# Patient Record
Sex: FEMALE | Race: WHITE | NOT HISPANIC OR LATINO | Employment: OTHER | URBAN - NONMETROPOLITAN AREA
[De-identification: names, ages, dates, MRNs, and addresses within clinical notes are randomized per-mention and may not be internally consistent; named-entity substitution may affect disease eponyms.]

---

## 2018-01-01 ENCOUNTER — LAB REQUISITION (OUTPATIENT)
Dept: LAB | Facility: HOSPITAL | Age: 83
End: 2018-01-01

## 2018-01-01 ENCOUNTER — APPOINTMENT (OUTPATIENT)
Dept: GENERAL RADIOLOGY | Facility: HOSPITAL | Age: 83
End: 2018-01-01

## 2018-01-01 ENCOUNTER — HOSPITAL ENCOUNTER (INPATIENT)
Facility: HOSPITAL | Age: 83
LOS: 8 days | End: 2018-12-03
Attending: EMERGENCY MEDICINE | Admitting: INTERNAL MEDICINE

## 2018-01-01 ENCOUNTER — APPOINTMENT (OUTPATIENT)
Dept: CT IMAGING | Facility: HOSPITAL | Age: 83
End: 2018-01-01

## 2018-01-01 ENCOUNTER — OFFICE VISIT (OUTPATIENT)
Dept: UROLOGY | Facility: CLINIC | Age: 83
End: 2018-01-01

## 2018-01-01 ENCOUNTER — ANESTHESIA (OUTPATIENT)
Dept: PERIOP | Facility: HOSPITAL | Age: 83
End: 2018-01-01

## 2018-01-01 ENCOUNTER — ANESTHESIA EVENT (OUTPATIENT)
Dept: PERIOP | Facility: HOSPITAL | Age: 83
End: 2018-01-01

## 2018-01-01 ENCOUNTER — HOSPITAL ENCOUNTER (INPATIENT)
Facility: HOSPITAL | Age: 83
LOS: 7 days | Discharge: NURSING, REHAB OR LONG-TERM CARE | End: 2018-09-07
Attending: FAMILY MEDICINE | Admitting: ORTHOPAEDIC SURGERY

## 2018-01-01 VITALS — TEMPERATURE: 97.2 F | WEIGHT: 108 LBS | HEIGHT: 63 IN | BODY MASS INDEX: 19.14 KG/M2

## 2018-01-01 VITALS
RESPIRATION RATE: 22 BRPM | TEMPERATURE: 97.9 F | BODY MASS INDEX: 23.77 KG/M2 | HEIGHT: 61 IN | SYSTOLIC BLOOD PRESSURE: 54 MMHG | OXYGEN SATURATION: 73 % | DIASTOLIC BLOOD PRESSURE: 32 MMHG | WEIGHT: 125.9 LBS

## 2018-01-01 VITALS
SYSTOLIC BLOOD PRESSURE: 113 MMHG | WEIGHT: 114.44 LBS | BODY MASS INDEX: 20.28 KG/M2 | RESPIRATION RATE: 18 BRPM | OXYGEN SATURATION: 97 % | TEMPERATURE: 98.5 F | DIASTOLIC BLOOD PRESSURE: 42 MMHG | HEIGHT: 63 IN | HEART RATE: 93 BPM

## 2018-01-01 DIAGNOSIS — Z74.09 IMPAIRED MOBILITY: ICD-10-CM

## 2018-01-01 DIAGNOSIS — R41.82 ALTERED MENTAL STATUS, UNSPECIFIED ALTERED MENTAL STATUS TYPE: ICD-10-CM

## 2018-01-01 DIAGNOSIS — Z00.00 ENCOUNTER FOR GENERAL ADULT MEDICAL EXAMINATION WITHOUT ABNORMAL FINDINGS: ICD-10-CM

## 2018-01-01 DIAGNOSIS — R53.1 DECREASED STRENGTH, ENDURANCE, AND MOBILITY: ICD-10-CM

## 2018-01-01 DIAGNOSIS — Z78.9 IMPAIRED MOBILITY AND ADLS: ICD-10-CM

## 2018-01-01 DIAGNOSIS — R68.89 DECREASED STRENGTH, ENDURANCE, AND MOBILITY: ICD-10-CM

## 2018-01-01 DIAGNOSIS — Z74.09 DECREASED STRENGTH, ENDURANCE, AND MOBILITY: ICD-10-CM

## 2018-01-01 DIAGNOSIS — R33.9 URINARY RETENTION: Primary | ICD-10-CM

## 2018-01-01 DIAGNOSIS — A04.72 C. DIFFICILE DIARRHEA: ICD-10-CM

## 2018-01-01 DIAGNOSIS — Z78.9 DECREASED ACTIVITIES OF DAILY LIVING (ADL): ICD-10-CM

## 2018-01-01 DIAGNOSIS — K56.609 BOWEL OBSTRUCTION (HCC): ICD-10-CM

## 2018-01-01 DIAGNOSIS — Z74.09 IMPAIRED MOBILITY AND ADLS: ICD-10-CM

## 2018-01-01 DIAGNOSIS — R13.12 OROPHARYNGEAL DYSPHAGIA: ICD-10-CM

## 2018-01-01 DIAGNOSIS — N39.0 ACUTE UTI: Primary | ICD-10-CM

## 2018-01-01 LAB
25(OH)D3 SERPL-MCNC: 36.6 NG/ML (ref 30–100)
A-A DO2: ABNORMAL MMHG
ABO GROUP BLD: NORMAL
ADV 40+41 DNA STL QL NAA+NON-PROBE: NOT DETECTED
ALBUMIN SERPL-MCNC: 1.8 G/DL (ref 3.5–5)
ALBUMIN SERPL-MCNC: 2.1 G/DL (ref 3.5–5)
ALBUMIN SERPL-MCNC: 2.2 G/DL (ref 3.5–5)
ALBUMIN SERPL-MCNC: 2.4 G/DL (ref 3.5–5)
ALBUMIN SERPL-MCNC: 2.5 G/DL (ref 3.5–5)
ALBUMIN SERPL-MCNC: 3 G/DL (ref 3.5–5)
ALBUMIN SERPL-MCNC: 3 G/DL (ref 3.5–5)
ALBUMIN SERPL-MCNC: 3.7 G/DL (ref 3.5–5)
ALBUMIN/GLOB SERPL: 0.8 G/DL (ref 1.1–2.5)
ALBUMIN/GLOB SERPL: 0.9 G/DL (ref 1.1–2.5)
ALBUMIN/GLOB SERPL: 0.9 G/DL (ref 1.1–2.5)
ALBUMIN/GLOB SERPL: 1 G/DL (ref 1.1–2.5)
ALBUMIN/GLOB SERPL: 1 G/DL (ref 1.1–2.5)
ALBUMIN/GLOB SERPL: 1.1 G/DL (ref 1.1–2.5)
ALBUMIN/GLOB SERPL: 1.3 G/DL (ref 1.1–2.5)
ALBUMIN/GLOB SERPL: 1.3 G/DL (ref 1.1–2.5)
ALP SERPL-CCNC: 108 U/L (ref 24–120)
ALP SERPL-CCNC: 115 U/L (ref 24–120)
ALP SERPL-CCNC: 122 U/L (ref 24–120)
ALP SERPL-CCNC: 133 U/L (ref 24–120)
ALP SERPL-CCNC: 157 U/L (ref 24–120)
ALP SERPL-CCNC: 167 U/L (ref 24–120)
ALP SERPL-CCNC: 257 U/L (ref 24–120)
ALP SERPL-CCNC: 261 U/L (ref 24–120)
ALT SERPL W P-5'-P-CCNC: 23 U/L (ref 0–54)
ALT SERPL W P-5'-P-CCNC: 25 U/L (ref 0–54)
ALT SERPL W P-5'-P-CCNC: 26 U/L (ref 0–54)
ALT SERPL W P-5'-P-CCNC: 26 U/L (ref 0–54)
ALT SERPL W P-5'-P-CCNC: 34 U/L (ref 0–54)
ALT SERPL W P-5'-P-CCNC: 36 U/L (ref 0–54)
ALT SERPL W P-5'-P-CCNC: 36 U/L (ref 0–54)
ALT SERPL W P-5'-P-CCNC: 44 U/L (ref 0–54)
AMYLASE SERPL-CCNC: 1782 U/L (ref 30–110)
ANION GAP SERPL CALCULATED.3IONS-SCNC: 13 MMOL/L (ref 4–13)
ANION GAP SERPL CALCULATED.3IONS-SCNC: 16 MMOL/L (ref 4–13)
ANION GAP SERPL CALCULATED.3IONS-SCNC: 17 MMOL/L (ref 4–13)
ANION GAP SERPL CALCULATED.3IONS-SCNC: 4 MMOL/L (ref 4–13)
ANION GAP SERPL CALCULATED.3IONS-SCNC: 4 MMOL/L (ref 4–13)
ANION GAP SERPL CALCULATED.3IONS-SCNC: 5 MMOL/L (ref 4–13)
ANION GAP SERPL CALCULATED.3IONS-SCNC: 7 MMOL/L (ref 4–13)
ANION GAP SERPL CALCULATED.3IONS-SCNC: 8 MMOL/L (ref 4–13)
ANION GAP SERPL CALCULATED.3IONS-SCNC: 9 MMOL/L (ref 4–13)
ANISOCYTOSIS BLD QL: ABNORMAL
ANISOCYTOSIS BLD QL: ABNORMAL
ARTERIAL PATENCY WRIST A: POSITIVE
ARTICHOKE IGE QN: 89 MG/DL (ref 0–99)
AST SERPL-CCNC: 119 U/L (ref 7–45)
AST SERPL-CCNC: 28 U/L (ref 7–45)
AST SERPL-CCNC: 41 U/L (ref 7–45)
AST SERPL-CCNC: 43 U/L (ref 7–45)
AST SERPL-CCNC: 46 U/L (ref 7–45)
AST SERPL-CCNC: 60 U/L (ref 7–45)
AST SERPL-CCNC: 63 U/L (ref 7–45)
AST SERPL-CCNC: 93 U/L (ref 7–45)
ASTRO TYP 1-8 RNA STL QL NAA+NON-PROBE: NOT DETECTED
ATMOSPHERIC PRESS: 736 MMHG
ATMOSPHERIC PRESS: 738 MMHG
ATMOSPHERIC PRESS: 744 MMHG
ATMOSPHERIC PRESS: 746 MMHG
BACTERIA SPEC AEROBE CULT: ABNORMAL
BACTERIA SPEC AEROBE CULT: NORMAL
BACTERIA UR QL AUTO: ABNORMAL /HPF
BASE EXCESS BLDA CALC-SCNC: -10.1 MMOL/L (ref 0–2)
BASE EXCESS BLDA CALC-SCNC: -12.2 MMOL/L (ref 0–2)
BASE EXCESS BLDA CALC-SCNC: -7.8 MMOL/L (ref 0–2)
BASE EXCESS BLDA CALC-SCNC: -7.9 MMOL/L (ref 0–2)
BASOPHILS # BLD AUTO: 0.06 10*3/MM3 (ref 0–0.2)
BASOPHILS # BLD AUTO: 0.07 10*3/MM3 (ref 0–0.2)
BASOPHILS # BLD AUTO: 0.08 10*3/MM3 (ref 0–0.2)
BASOPHILS # BLD AUTO: 0.08 10*3/MM3 (ref 0–0.2)
BASOPHILS # BLD AUTO: 0.09 10*3/MM3 (ref 0–0.2)
BASOPHILS # BLD AUTO: 0.1 10*3/MM3 (ref 0–0.2)
BASOPHILS # BLD AUTO: 0.12 10*3/MM3 (ref 0–0.2)
BASOPHILS # BLD MANUAL: 0.25 10*3/MM3 (ref 0–0.2)
BASOPHILS NFR BLD AUTO: 0.4 % (ref 0–2)
BASOPHILS NFR BLD AUTO: 0.6 % (ref 0–2)
BASOPHILS NFR BLD AUTO: 0.8 % (ref 0–2)
BASOPHILS NFR BLD AUTO: 2 % (ref 0–2)
BDY SITE: ABNORMAL
BILIRUB SERPL-MCNC: 0.5 MG/DL (ref 0.1–1)
BILIRUB SERPL-MCNC: 0.6 MG/DL (ref 0.1–1)
BILIRUB SERPL-MCNC: 0.7 MG/DL (ref 0.1–1)
BILIRUB SERPL-MCNC: 0.8 MG/DL (ref 0.1–1)
BILIRUB SERPL-MCNC: 0.9 MG/DL (ref 0.1–1)
BILIRUB SERPL-MCNC: 1 MG/DL (ref 0.1–1)
BILIRUB UR QL STRIP: NEGATIVE
BLD GP AB SCN SERPL QL: NEGATIVE
BODY TEMPERATURE: 37 C
BUN BLD-MCNC: 13 MG/DL (ref 5–21)
BUN BLD-MCNC: 14 MG/DL (ref 5–21)
BUN BLD-MCNC: 14 MG/DL (ref 5–21)
BUN BLD-MCNC: 15 MG/DL (ref 5–21)
BUN BLD-MCNC: 15 MG/DL (ref 5–21)
BUN BLD-MCNC: 16 MG/DL (ref 5–21)
BUN BLD-MCNC: 21 MG/DL (ref 5–21)
BUN BLD-MCNC: 21 MG/DL (ref 5–21)
BUN BLD-MCNC: 22 MG/DL (ref 5–21)
BUN BLD-MCNC: 24 MG/DL (ref 5–21)
BUN BLD-MCNC: 29 MG/DL (ref 5–21)
BUN BLD-MCNC: 29 MG/DL (ref 5–21)
BUN BLD-MCNC: 38 MG/DL (ref 5–21)
BUN BLD-MCNC: 38 MG/DL (ref 5–21)
BUN BLD-MCNC: 46 MG/DL (ref 5–21)
BUN BLD-MCNC: 47 MG/DL (ref 5–21)
BUN BLD-MCNC: 54 MG/DL (ref 5–21)
BUN/CREAT SERPL: 12 (ref 7–25)
BUN/CREAT SERPL: 13.6 (ref 7–25)
BUN/CREAT SERPL: 15.9 (ref 7–25)
BUN/CREAT SERPL: 16.3 (ref 7–25)
BUN/CREAT SERPL: 17.8 (ref 7–25)
BUN/CREAT SERPL: 18.6 (ref 7–25)
BUN/CREAT SERPL: 18.8 (ref 7–25)
BUN/CREAT SERPL: 19.7 (ref 7–25)
BUN/CREAT SERPL: 20 (ref 7–25)
BUN/CREAT SERPL: 20.3 (ref 7–25)
BUN/CREAT SERPL: 23.3 (ref 7–25)
BUN/CREAT SERPL: 24.7 (ref 7–25)
BUN/CREAT SERPL: 25.5 (ref 7–25)
BUN/CREAT SERPL: 31.5 (ref 7–25)
BUN/CREAT SERPL: 37.6 (ref 7–25)
BURR CELLS BLD QL SMEAR: ABNORMAL
BURR CELLS BLD QL SMEAR: ABNORMAL
C CAYETANENSIS DNA STL QL NAA+NON-PROBE: NOT DETECTED
C DIFF TOX GENS STL QL NAA+PROBE: DETECTED
CA-I BLD-MCNC: 4.1 MG/DL (ref 4.6–5.4)
CA-I BLD-MCNC: 4.2 MG/DL (ref 4.6–5.4)
CA-I BLD-MCNC: 4.32 MG/DL (ref 4.6–5.4)
CALCIUM SPEC-SCNC: 6.8 MG/DL (ref 8.4–10.4)
CALCIUM SPEC-SCNC: 7.1 MG/DL (ref 8.4–10.4)
CALCIUM SPEC-SCNC: 7.3 MG/DL (ref 8.4–10.4)
CALCIUM SPEC-SCNC: 8.1 MG/DL (ref 8.4–10.4)
CALCIUM SPEC-SCNC: 8.3 MG/DL (ref 8.4–10.4)
CALCIUM SPEC-SCNC: 8.4 MG/DL (ref 8.4–10.4)
CALCIUM SPEC-SCNC: 8.4 MG/DL (ref 8.4–10.4)
CALCIUM SPEC-SCNC: 8.5 MG/DL (ref 8.4–10.4)
CALCIUM SPEC-SCNC: 8.6 MG/DL (ref 8.4–10.4)
CALCIUM SPEC-SCNC: 9 MG/DL (ref 8.4–10.4)
CALCIUM SPEC-SCNC: 9.1 MG/DL (ref 8.4–10.4)
CALCIUM SPEC-SCNC: 9.2 MG/DL (ref 8.4–10.4)
CAMPY SP DNA.DIARRHEA STL QL NAA+PROBE: NOT DETECTED
CHLORIDE SERPL-SCNC: 103 MMOL/L (ref 98–110)
CHLORIDE SERPL-SCNC: 104 MMOL/L (ref 98–110)
CHLORIDE SERPL-SCNC: 105 MMOL/L (ref 98–110)
CHLORIDE SERPL-SCNC: 106 MMOL/L (ref 98–110)
CHLORIDE SERPL-SCNC: 107 MMOL/L (ref 98–110)
CHLORIDE SERPL-SCNC: 109 MMOL/L (ref 98–110)
CHLORIDE SERPL-SCNC: 110 MMOL/L (ref 98–110)
CHLORIDE SERPL-SCNC: 110 MMOL/L (ref 98–110)
CHLORIDE SERPL-SCNC: 96 MMOL/L (ref 98–110)
CHOLEST SERPL-MCNC: 108 MG/DL (ref 130–200)
CHOLEST SERPL-MCNC: <50 MG/DL (ref 130–200)
CLARITY UR: ABNORMAL
CLARITY UR: CLEAR
CLARITY UR: CLEAR
CO2 SERPL-SCNC: 12 MMOL/L (ref 24–31)
CO2 SERPL-SCNC: 15 MMOL/L (ref 24–31)
CO2 SERPL-SCNC: 16 MMOL/L (ref 24–31)
CO2 SERPL-SCNC: 17 MMOL/L (ref 24–31)
CO2 SERPL-SCNC: 20 MMOL/L (ref 24–31)
CO2 SERPL-SCNC: 22 MMOL/L (ref 24–31)
CO2 SERPL-SCNC: 24 MMOL/L (ref 24–31)
CO2 SERPL-SCNC: 25 MMOL/L (ref 24–31)
CO2 SERPL-SCNC: 25 MMOL/L (ref 24–31)
CO2 SERPL-SCNC: 26 MMOL/L (ref 24–31)
CO2 SERPL-SCNC: 27 MMOL/L (ref 24–31)
CO2 SERPL-SCNC: 27 MMOL/L (ref 24–31)
CO2 SERPL-SCNC: 28 MMOL/L (ref 24–31)
CO2 SERPL-SCNC: 28 MMOL/L (ref 24–31)
CO2 SERPL-SCNC: 31 MMOL/L (ref 24–31)
COHGB MFR BLD: 1 % (ref 0–5)
COLOR UR: ABNORMAL
COLOR UR: YELLOW
CREAT BLD-MCNC: 0.74 MG/DL (ref 0.5–1.4)
CREAT BLD-MCNC: 0.85 MG/DL (ref 0.5–1.4)
CREAT BLD-MCNC: 0.88 MG/DL (ref 0.5–1.4)
CREAT BLD-MCNC: 0.9 MG/DL (ref 0.5–1.4)
CREAT BLD-MCNC: 0.9 MG/DL (ref 0.5–1.4)
CREAT BLD-MCNC: 0.92 MG/DL (ref 0.5–1.4)
CREAT BLD-MCNC: 0.92 MG/DL (ref 0.5–1.4)
CREAT BLD-MCNC: 0.94 MG/DL (ref 0.5–1.4)
CREAT BLD-MCNC: 1.01 MG/DL (ref 0.5–1.4)
CREAT BLD-MCNC: 1.03 MG/DL (ref 0.5–1.4)
CREAT BLD-MCNC: 1.08 MG/DL (ref 0.5–1.4)
CREAT BLD-MCNC: 1.18 MG/DL (ref 0.5–1.4)
CREAT BLD-MCNC: 1.47 MG/DL (ref 0.5–1.4)
CREAT BLD-MCNC: 2.04 MG/DL (ref 0.5–1.4)
CREAT BLD-MCNC: 2.47 MG/DL (ref 0.5–1.4)
CREAT BLD-MCNC: 2.5 MG/DL (ref 0.5–1.4)
CREAT BLD-MCNC: 2.7 MG/DL (ref 0.5–1.4)
CREAT UR-MCNC: 58 MG/DL
CRP SERPL-MCNC: 15.78 MG/DL (ref 0–0.99)
CRYPTOSP STL CULT: NOT DETECTED
CYTOLOGIST CVX/VAG CYTO: NORMAL
D-LACTATE SERPL-SCNC: 1.3 MMOL/L (ref 0.5–2)
D-LACTATE SERPL-SCNC: 1.8 MMOL/L (ref 0.5–2)
D-LACTATE SERPL-SCNC: 2 MMOL/L (ref 0.5–2)
DEPRECATED RDW RBC AUTO: 44.2 FL (ref 40–54)
DEPRECATED RDW RBC AUTO: 44.4 FL (ref 40–54)
DEPRECATED RDW RBC AUTO: 45.1 FL (ref 40–54)
DEPRECATED RDW RBC AUTO: 45.5 FL (ref 40–54)
DEPRECATED RDW RBC AUTO: 45.5 FL (ref 40–54)
DEPRECATED RDW RBC AUTO: 46.5 FL (ref 40–54)
DEPRECATED RDW RBC AUTO: 47.2 FL (ref 40–54)
DEPRECATED RDW RBC AUTO: 48.4 FL (ref 40–54)
DEPRECATED RDW RBC AUTO: 49.1 FL (ref 40–54)
DEPRECATED RDW RBC AUTO: 49.5 FL (ref 40–54)
DEPRECATED RDW RBC AUTO: 49.6 FL (ref 40–54)
DEPRECATED RDW RBC AUTO: 49.7 FL (ref 40–54)
DEPRECATED RDW RBC AUTO: 50.4 FL (ref 40–54)
E COLI DNA SPEC QL NAA+PROBE: NOT DETECTED
E HISTOLYT AG STL-ACNC: NOT DETECTED
EAEC PAA PLAS AGGR+AATA ST NAA+NON-PRB: NOT DETECTED
EC STX1 + STX2 GENES STL NAA+PROBE: NOT DETECTED
EOSINOPHIL # BLD AUTO: 0.23 10*3/MM3 (ref 0–0.7)
EOSINOPHIL # BLD AUTO: 0.25 10*3/MM3 (ref 0–0.7)
EOSINOPHIL # BLD AUTO: 0.32 10*3/MM3 (ref 0–0.7)
EOSINOPHIL # BLD AUTO: 0.4 10*3/MM3 (ref 0–0.7)
EOSINOPHIL # BLD AUTO: 0.45 10*3/MM3 (ref 0–0.7)
EOSINOPHIL # BLD AUTO: 0.49 10*3/MM3 (ref 0–0.7)
EOSINOPHIL # BLD AUTO: 0.49 10*3/MM3 (ref 0–0.7)
EOSINOPHIL # BLD MANUAL: 0.25 10*3/MM3 (ref 0–0.7)
EOSINOPHIL NFR BLD AUTO: 0.9 % (ref 0–4)
EOSINOPHIL NFR BLD AUTO: 1.6 % (ref 0–4)
EOSINOPHIL NFR BLD AUTO: 1.7 % (ref 0–4)
EOSINOPHIL NFR BLD AUTO: 2.6 % (ref 0–4)
EOSINOPHIL NFR BLD AUTO: 3.2 % (ref 0–4)
EOSINOPHIL NFR BLD AUTO: 3.6 % (ref 0–4)
EOSINOPHIL NFR BLD AUTO: 4.6 % (ref 0–4)
EOSINOPHIL NFR BLD MANUAL: 2 % (ref 0–4)
EPEC EAE GENE STL QL NAA+NON-PROBE: NOT DETECTED
ERYTHROCYTE [DISTWIDTH] IN BLOOD BY AUTOMATED COUNT: 13.2 % (ref 12–15)
ERYTHROCYTE [DISTWIDTH] IN BLOOD BY AUTOMATED COUNT: 13.3 % (ref 12–15)
ERYTHROCYTE [DISTWIDTH] IN BLOOD BY AUTOMATED COUNT: 13.3 % (ref 12–15)
ERYTHROCYTE [DISTWIDTH] IN BLOOD BY AUTOMATED COUNT: 13.4 % (ref 12–15)
ERYTHROCYTE [DISTWIDTH] IN BLOOD BY AUTOMATED COUNT: 13.5 % (ref 12–15)
ERYTHROCYTE [DISTWIDTH] IN BLOOD BY AUTOMATED COUNT: 14.8 % (ref 12–15)
ERYTHROCYTE [DISTWIDTH] IN BLOOD BY AUTOMATED COUNT: 14.9 % (ref 12–15)
ERYTHROCYTE [DISTWIDTH] IN BLOOD BY AUTOMATED COUNT: 14.9 % (ref 12–15)
ERYTHROCYTE [DISTWIDTH] IN BLOOD BY AUTOMATED COUNT: 15 % (ref 12–15)
ERYTHROCYTE [DISTWIDTH] IN BLOOD BY AUTOMATED COUNT: 15.3 % (ref 12–15)
ERYTHROCYTE [DISTWIDTH] IN BLOOD BY AUTOMATED COUNT: 15.4 % (ref 12–15)
ERYTHROCYTE [DISTWIDTH] IN BLOOD BY AUTOMATED COUNT: 15.6 % (ref 12–15)
ERYTHROCYTE [DISTWIDTH] IN BLOOD BY AUTOMATED COUNT: 15.8 % (ref 12–15)
ERYTHROCYTE [SEDIMENTATION RATE] IN BLOOD: <1 MM/HR (ref 0–20)
ETEC LTA+ST1A+ST1B TOX ST NAA+NON-PROBE: NOT DETECTED
G LAMBLIA DNA SPEC QL NAA+PROBE: NOT DETECTED
GAS FLOW AIRWAY: 2 LPM
GFR SERPL CREATININE-BSD FRML MDRD: 17 ML/MIN/1.73
GFR SERPL CREATININE-BSD FRML MDRD: 18 ML/MIN/1.73
GFR SERPL CREATININE-BSD FRML MDRD: 18 ML/MIN/1.73
GFR SERPL CREATININE-BSD FRML MDRD: 23 ML/MIN/1.73
GFR SERPL CREATININE-BSD FRML MDRD: 34 ML/MIN/1.73
GFR SERPL CREATININE-BSD FRML MDRD: 43 ML/MIN/1.73
GFR SERPL CREATININE-BSD FRML MDRD: 48 ML/MIN/1.73
GFR SERPL CREATININE-BSD FRML MDRD: 51 ML/MIN/1.73
GFR SERPL CREATININE-BSD FRML MDRD: 52 ML/MIN/1.73
GFR SERPL CREATININE-BSD FRML MDRD: 56 ML/MIN/1.73
GFR SERPL CREATININE-BSD FRML MDRD: 58 ML/MIN/1.73
GFR SERPL CREATININE-BSD FRML MDRD: 58 ML/MIN/1.73
GFR SERPL CREATININE-BSD FRML MDRD: 59 ML/MIN/1.73
GFR SERPL CREATININE-BSD FRML MDRD: 59 ML/MIN/1.73
GFR SERPL CREATININE-BSD FRML MDRD: 61 ML/MIN/1.73
GFR SERPL CREATININE-BSD FRML MDRD: 63 ML/MIN/1.73
GFR SERPL CREATININE-BSD FRML MDRD: 74 ML/MIN/1.73
GLOBULIN UR ELPH-MCNC: 1.8 GM/DL
GLOBULIN UR ELPH-MCNC: 1.9 GM/DL
GLOBULIN UR ELPH-MCNC: 2.1 GM/DL
GLOBULIN UR ELPH-MCNC: 2.4 GM/DL
GLOBULIN UR ELPH-MCNC: 2.8 GM/DL
GLOBULIN UR ELPH-MCNC: 2.8 GM/DL
GLOBULIN UR ELPH-MCNC: 3.1 GM/DL
GLOBULIN UR ELPH-MCNC: 3.2 GM/DL
GLUCOSE BLD-MCNC: 100 MG/DL (ref 70–100)
GLUCOSE BLD-MCNC: 108 MG/DL (ref 70–100)
GLUCOSE BLD-MCNC: 109 MG/DL (ref 70–100)
GLUCOSE BLD-MCNC: 112 MG/DL (ref 70–100)
GLUCOSE BLD-MCNC: 128 MG/DL (ref 70–100)
GLUCOSE BLD-MCNC: 185 MG/DL (ref 70–100)
GLUCOSE BLD-MCNC: 57 MG/DL (ref 70–100)
GLUCOSE BLD-MCNC: 65 MG/DL (ref 70–100)
GLUCOSE BLD-MCNC: 65 MG/DL (ref 70–100)
GLUCOSE BLD-MCNC: 70 MG/DL (ref 70–100)
GLUCOSE BLD-MCNC: 78 MG/DL (ref 70–100)
GLUCOSE BLD-MCNC: 83 MG/DL (ref 70–100)
GLUCOSE BLD-MCNC: 84 MG/DL (ref 70–100)
GLUCOSE BLD-MCNC: 86 MG/DL (ref 70–100)
GLUCOSE BLD-MCNC: 87 MG/DL (ref 70–100)
GLUCOSE BLD-MCNC: 89 MG/DL (ref 70–100)
GLUCOSE BLD-MCNC: 94 MG/DL (ref 70–100)
GLUCOSE BLDC GLUCOMTR-MCNC: 137 MG/DL (ref 70–130)
GLUCOSE BLDC GLUCOMTR-MCNC: 192 MG/DL (ref 70–130)
GLUCOSE BLDC GLUCOMTR-MCNC: 80 MG/DL (ref 70–130)
GLUCOSE UR STRIP-MCNC: NEGATIVE MG/DL
HCO3 BLDA-SCNC: 13.7 MMOL/L (ref 20–26)
HCO3 BLDA-SCNC: 15.6 MMOL/L (ref 20–26)
HCO3 BLDA-SCNC: 15.6 MMOL/L (ref 20–26)
HCO3 BLDA-SCNC: 16.8 MMOL/L (ref 20–26)
HCT VFR BLD AUTO: 26.8 % (ref 37–47)
HCT VFR BLD AUTO: 27.5 % (ref 37–47)
HCT VFR BLD AUTO: 27.9 % (ref 37–47)
HCT VFR BLD AUTO: 28.5 % (ref 37–47)
HCT VFR BLD AUTO: 29.6 % (ref 37–47)
HCT VFR BLD AUTO: 29.6 % (ref 37–47)
HCT VFR BLD AUTO: 32 % (ref 37–47)
HCT VFR BLD AUTO: 32.5 % (ref 37–47)
HCT VFR BLD AUTO: 32.6 % (ref 37–47)
HCT VFR BLD AUTO: 32.7 % (ref 37–47)
HCT VFR BLD AUTO: 33.2 % (ref 37–47)
HCT VFR BLD AUTO: 33.7 % (ref 37–47)
HCT VFR BLD AUTO: 37.1 % (ref 37–47)
HCT VFR BLD AUTO: 38.4 % (ref 37–47)
HCT VFR BLD AUTO: 38.8 % (ref 37–47)
HCT VFR BLD AUTO: 39 % (ref 37–47)
HCT VFR BLD AUTO: 39.5 % (ref 37–47)
HCT VFR BLD AUTO: 41.4 % (ref 37–47)
HCT VFR BLD AUTO: 42.1 % (ref 37–47)
HCT VFR BLD CALC: 27.5 % (ref 38–51)
HDLC SERPL-MCNC: 11 MG/DL
HGB BLD-MCNC: 10.5 G/DL (ref 12–16)
HGB BLD-MCNC: 10.6 G/DL (ref 12–16)
HGB BLD-MCNC: 10.7 G/DL (ref 12–16)
HGB BLD-MCNC: 10.8 G/DL (ref 12–16)
HGB BLD-MCNC: 12.1 G/DL (ref 12–16)
HGB BLD-MCNC: 12.2 G/DL (ref 12–16)
HGB BLD-MCNC: 12.6 G/DL (ref 12–16)
HGB BLD-MCNC: 12.8 G/DL (ref 12–16)
HGB BLD-MCNC: 12.8 G/DL (ref 12–16)
HGB BLD-MCNC: 13.4 G/DL (ref 12–16)
HGB BLD-MCNC: 13.6 G/DL (ref 12–16)
HGB BLD-MCNC: 8.7 G/DL (ref 12–16)
HGB BLD-MCNC: 8.9 G/DL (ref 12–16)
HGB BLD-MCNC: 9 G/DL (ref 12–16)
HGB BLD-MCNC: 9.4 G/DL (ref 12–16)
HGB BLD-MCNC: 9.7 G/DL (ref 12–16)
HGB BLD-MCNC: 9.8 G/DL (ref 12–16)
HGB BLDA-MCNC: 9 G/DL (ref 13.5–17.5)
HGB UR QL STRIP.AUTO: ABNORMAL
HGB UR QL STRIP.AUTO: NEGATIVE
HGB UR QL STRIP.AUTO: NEGATIVE
HOROWITZ INDEX BLD+IHG-RTO: 30 %
HOROWITZ INDEX BLD+IHG-RTO: 30 %
HOROWITZ INDEX BLD+IHG-RTO: 50 %
HYALINE CASTS UR QL AUTO: ABNORMAL /LPF
HYPOCHROMIA BLD QL: ABNORMAL
IMM GRANULOCYTES # BLD: 0.05 10*3/MM3 (ref 0–0.03)
IMM GRANULOCYTES # BLD: 0.08 10*3/MM3 (ref 0–0.03)
IMM GRANULOCYTES # BLD: 0.15 10*3/MM3 (ref 0–0.03)
IMM GRANULOCYTES # BLD: 0.15 10*3/MM3 (ref 0–0.03)
IMM GRANULOCYTES # BLD: 0.18 10*3/MM3 (ref 0–0.03)
IMM GRANULOCYTES # BLD: 0.26 10*3/MM3 (ref 0–0.03)
IMM GRANULOCYTES # BLD: 0.34 10*3/MM3 (ref 0–0.03)
IMM GRANULOCYTES NFR BLD: 0.5 % (ref 0–5)
IMM GRANULOCYTES NFR BLD: 0.7 % (ref 0–5)
IMM GRANULOCYTES NFR BLD: 0.8 % (ref 0–5)
IMM GRANULOCYTES NFR BLD: 1 % (ref 0–5)
IMM GRANULOCYTES NFR BLD: 2.2 % (ref 0–5)
INR PPP: 1.61 (ref 0.91–1.09)
IRON 24H UR-MRATE: 32 MCG/DL (ref 42–180)
IRON SATN MFR SERPL: 13 % (ref 20–45)
KETONES UR QL STRIP: ABNORMAL
KETONES UR QL STRIP: ABNORMAL
KETONES UR QL STRIP: NEGATIVE
LDLC/HDLC SERPL: 7.24 {RATIO}
LEUKOCYTE ESTERASE UR QL STRIP.AUTO: ABNORMAL
LIPASE SERPL-CCNC: 2080 U/L (ref 23–203)
LIPASE SERPL-CCNC: 2981 U/L (ref 23–203)
LYMPHOCYTES # BLD AUTO: 1.12 10*3/MM3 (ref 0.72–4.86)
LYMPHOCYTES # BLD AUTO: 1.23 10*3/MM3 (ref 0.72–4.86)
LYMPHOCYTES # BLD AUTO: 1.28 10*3/MM3 (ref 0.72–4.86)
LYMPHOCYTES # BLD AUTO: 1.31 10*3/MM3 (ref 0.72–4.86)
LYMPHOCYTES # BLD AUTO: 1.38 10*3/MM3 (ref 0.72–4.86)
LYMPHOCYTES # BLD AUTO: 1.49 10*3/MM3 (ref 0.72–4.86)
LYMPHOCYTES # BLD AUTO: 1.63 10*3/MM3 (ref 0.72–4.86)
LYMPHOCYTES # BLD MANUAL: 0 10*3/MM3 (ref 0.72–4.86)
LYMPHOCYTES # BLD MANUAL: 0 10*3/MM3 (ref 0.72–4.86)
LYMPHOCYTES # BLD MANUAL: 1.01 10*3/MM3 (ref 0.72–4.86)
LYMPHOCYTES NFR BLD AUTO: 14.7 % (ref 15–45)
LYMPHOCYTES NFR BLD AUTO: 15.1 % (ref 15–45)
LYMPHOCYTES NFR BLD AUTO: 4.4 % (ref 15–45)
LYMPHOCYTES NFR BLD AUTO: 6.5 % (ref 15–45)
LYMPHOCYTES NFR BLD AUTO: 7.1 % (ref 15–45)
LYMPHOCYTES NFR BLD AUTO: 8.4 % (ref 15–45)
LYMPHOCYTES NFR BLD AUTO: 9.1 % (ref 15–45)
LYMPHOCYTES NFR BLD MANUAL: 0 % (ref 15–45)
LYMPHOCYTES NFR BLD MANUAL: 0 % (ref 15–45)
LYMPHOCYTES NFR BLD MANUAL: 2 % (ref 4–12)
LYMPHOCYTES NFR BLD MANUAL: 2.2 % (ref 4–12)
LYMPHOCYTES NFR BLD MANUAL: 3 % (ref 4–12)
LYMPHOCYTES NFR BLD MANUAL: 8 % (ref 15–45)
Lab: ABNORMAL
MACROCYTES BLD QL SMEAR: ABNORMAL
MAGNESIUM SERPL-MCNC: 1.6 MG/DL (ref 1.4–2.2)
MAGNESIUM SERPL-MCNC: 1.8 MG/DL (ref 1.4–2.2)
MCH RBC QN AUTO: 28.1 PG (ref 28–32)
MCH RBC QN AUTO: 28.3 PG (ref 28–32)
MCH RBC QN AUTO: 28.5 PG (ref 28–32)
MCH RBC QN AUTO: 28.5 PG (ref 28–32)
MCH RBC QN AUTO: 28.6 PG (ref 28–32)
MCH RBC QN AUTO: 28.7 PG (ref 28–32)
MCH RBC QN AUTO: 28.8 PG (ref 28–32)
MCH RBC QN AUTO: 29 PG (ref 28–32)
MCH RBC QN AUTO: 29.2 PG (ref 28–32)
MCH RBC QN AUTO: 29.5 PG (ref 28–32)
MCH RBC QN AUTO: 29.7 PG (ref 28–32)
MCH RBC QN AUTO: 29.8 PG (ref 28–32)
MCH RBC QN AUTO: 29.8 PG (ref 28–32)
MCHC RBC AUTO-ENTMCNC: 31.6 G/DL (ref 33–36)
MCHC RBC AUTO-ENTMCNC: 31.8 G/DL (ref 33–36)
MCHC RBC AUTO-ENTMCNC: 31.8 G/DL (ref 33–36)
MCHC RBC AUTO-ENTMCNC: 31.9 G/DL (ref 33–36)
MCHC RBC AUTO-ENTMCNC: 31.9 G/DL (ref 33–36)
MCHC RBC AUTO-ENTMCNC: 32.5 G/DL (ref 33–36)
MCHC RBC AUTO-ENTMCNC: 32.6 G/DL (ref 33–36)
MCHC RBC AUTO-ENTMCNC: 32.8 G/DL (ref 33–36)
MCHC RBC AUTO-ENTMCNC: 32.8 G/DL (ref 33–36)
MCHC RBC AUTO-ENTMCNC: 32.9 G/DL (ref 33–36)
MCHC RBC AUTO-ENTMCNC: 32.9 G/DL (ref 33–36)
MCHC RBC AUTO-ENTMCNC: 33 G/DL (ref 33–36)
MCHC RBC AUTO-ENTMCNC: 33.2 G/DL (ref 33–36)
MCV RBC AUTO: 86.1 FL (ref 82–98)
MCV RBC AUTO: 86.7 FL (ref 82–98)
MCV RBC AUTO: 87.2 FL (ref 82–98)
MCV RBC AUTO: 87.2 FL (ref 82–98)
MCV RBC AUTO: 87.3 FL (ref 82–98)
MCV RBC AUTO: 87.5 FL (ref 82–98)
MCV RBC AUTO: 88.3 FL (ref 82–98)
MCV RBC AUTO: 90 FL (ref 82–98)
MCV RBC AUTO: 90.5 FL (ref 82–98)
MCV RBC AUTO: 91.6 FL (ref 82–98)
MCV RBC AUTO: 92.3 FL (ref 82–98)
MCV RBC AUTO: 92.5 FL (ref 82–98)
MCV RBC AUTO: 93.4 FL (ref 82–98)
METHGB BLD QL: 0.6 % (ref 0–3)
MICROCYTES BLD QL: ABNORMAL
MODALITY: ABNORMAL
MONOCYTES # BLD AUTO: 0.38 10*3/MM3 (ref 0.19–1.3)
MONOCYTES # BLD AUTO: 0.81 10*3/MM3 (ref 0.19–1.3)
MONOCYTES # BLD AUTO: 0.87 10*3/MM3 (ref 0.19–1.3)
MONOCYTES # BLD AUTO: 0.94 10*3/MM3 (ref 0.19–1.3)
MONOCYTES # BLD AUTO: 1.02 10*3/MM3 (ref 0.19–1.3)
MONOCYTES # BLD AUTO: 1.21 10*3/MM3 (ref 0.19–1.3)
MONOCYTES # BLD AUTO: 1.44 10*3/MM3 (ref 0.19–1.3)
MONOCYTES # BLD AUTO: 1.62 10*3/MM3 (ref 0.19–1.3)
MONOCYTES # BLD AUTO: 2.04 10*3/MM3 (ref 0.19–1.3)
MONOCYTES # BLD AUTO: 2.29 10*3/MM3 (ref 0.19–1.3)
MONOCYTES NFR BLD AUTO: 10.6 % (ref 4–12)
MONOCYTES NFR BLD AUTO: 10.8 % (ref 4–12)
MONOCYTES NFR BLD AUTO: 7.8 % (ref 4–12)
MONOCYTES NFR BLD AUTO: 7.9 % (ref 4–12)
MONOCYTES NFR BLD AUTO: 9 % (ref 4–12)
MONOCYTES NFR BLD AUTO: 9.2 % (ref 4–12)
MONOCYTES NFR BLD AUTO: 9.5 % (ref 4–12)
NEUTROPHILS # BLD AUTO: 10.77 10*3/MM3 (ref 1.87–8.4)
NEUTROPHILS # BLD AUTO: 11.76 10*3/MM3 (ref 1.87–8.4)
NEUTROPHILS # BLD AUTO: 11.93 10*3/MM3 (ref 1.87–8.4)
NEUTROPHILS # BLD AUTO: 14.86 10*3/MM3 (ref 1.87–8.4)
NEUTROPHILS # BLD AUTO: 15.18 10*3/MM3 (ref 1.87–8.4)
NEUTROPHILS # BLD AUTO: 21.57 10*3/MM3 (ref 1.87–8.4)
NEUTROPHILS # BLD AUTO: 35.36 10*3/MM3 (ref 1.87–8.4)
NEUTROPHILS # BLD AUTO: 42.8 10*3/MM3 (ref 1.87–8.4)
NEUTROPHILS # BLD AUTO: 6.87 10*3/MM3 (ref 1.87–8.4)
NEUTROPHILS # BLD AUTO: 7.88 10*3/MM3 (ref 1.87–8.4)
NEUTROPHILS NFR BLD AUTO: 69.5 % (ref 39–78)
NEUTROPHILS NFR BLD AUTO: 71.2 % (ref 39–78)
NEUTROPHILS NFR BLD AUTO: 77.2 % (ref 39–78)
NEUTROPHILS NFR BLD AUTO: 77.8 % (ref 39–78)
NEUTROPHILS NFR BLD AUTO: 78.5 % (ref 39–78)
NEUTROPHILS NFR BLD AUTO: 82.2 % (ref 39–78)
NEUTROPHILS NFR BLD AUTO: 84.3 % (ref 39–78)
NEUTROPHILS NFR BLD MANUAL: 85 % (ref 39–78)
NEUTROPHILS NFR BLD MANUAL: 89.1 % (ref 39–78)
NEUTROPHILS NFR BLD MANUAL: 97 % (ref 39–78)
NEUTS BAND NFR BLD MANUAL: 1 % (ref 0–10)
NEUTS BAND NFR BLD MANUAL: 6.5 % (ref 0–10)
NITRITE UR QL STRIP: NEGATIVE
NITRITE UR QL STRIP: NEGATIVE
NITRITE UR QL STRIP: POSITIVE
NOROVIRUS GI+II RNA STL QL NAA+NON-PROBE: NOT DETECTED
NOTE: ABNORMAL
NOTIFIED BY: ABNORMAL
NOTIFIED WHO: ABNORMAL
NRBC BLD MANUAL-RTO: 0 /100 WBC (ref 0–0)
OXYHGB MFR BLDV: 98.5 % (ref 94–99)
P SHIGELLOIDES DNA STL QL NAA+NON-PROBE: NOT DETECTED
PATH INTERP BLD-IMP: NORMAL
PCO2 BLDA: 23.4 MM HG (ref 35–45)
PCO2 BLDA: 25.9 MM HG (ref 35–45)
PCO2 BLDA: 30.3 MM HG (ref 35–45)
PCO2 BLDA: 43 MM HG (ref 35–45)
PCO2 TEMP ADJ BLD: ABNORMAL MM HG (ref 35–45)
PEEP RESPIRATORY: 5 CM[H2O]
PEEP RESPIRATORY: 5 CM[H2O]
PEEP RESPIRATORY: 7 CM[H2O]
PH BLDA: 7.17 PH UNITS (ref 7.35–7.45)
PH BLDA: 7.35 PH UNITS (ref 7.35–7.45)
PH BLDA: 7.38 PH UNITS (ref 7.35–7.45)
PH BLDA: 7.39 PH UNITS (ref 7.35–7.45)
PH UR STRIP.AUTO: 5.5 [PH] (ref 5–8)
PH UR STRIP.AUTO: 5.5 [PH] (ref 5–8)
PH UR STRIP.AUTO: 6 [PH] (ref 5–8)
PH UR STRIP.AUTO: 8 [PH] (ref 5–8)
PH UR STRIP.AUTO: <=5 [PH] (ref 5–8)
PH, TEMP CORRECTED: ABNORMAL PH UNITS (ref 7.35–7.45)
PHOSPHATE SERPL-MCNC: 3.3 MG/DL (ref 2.5–4.5)
PHOSPHATE SERPL-MCNC: 4.5 MG/DL (ref 2.5–4.5)
PLATELET # BLD AUTO: 244 10*3/MM3 (ref 130–400)
PLATELET # BLD AUTO: 326 10*3/MM3 (ref 130–400)
PLATELET # BLD AUTO: 345 10*3/MM3 (ref 130–400)
PLATELET # BLD AUTO: 346 10*3/MM3 (ref 130–400)
PLATELET # BLD AUTO: 380 10*3/MM3 (ref 130–400)
PLATELET # BLD AUTO: 439 10*3/MM3 (ref 130–400)
PLATELET # BLD AUTO: 460 10*3/MM3 (ref 130–400)
PLATELET # BLD AUTO: 472 10*3/MM3 (ref 130–400)
PLATELET # BLD AUTO: 504 10*3/MM3 (ref 130–400)
PLATELET # BLD AUTO: 505 10*3/MM3 (ref 130–400)
PLATELET # BLD AUTO: 509 10*3/MM3 (ref 130–400)
PLATELET # BLD AUTO: 584 10*3/MM3 (ref 130–400)
PLATELET # BLD AUTO: 613 10*3/MM3 (ref 130–400)
PMV BLD AUTO: 10.7 FL (ref 6–12)
PMV BLD AUTO: 10.7 FL (ref 6–12)
PMV BLD AUTO: 10.8 FL (ref 6–12)
PMV BLD AUTO: 10.8 FL (ref 6–12)
PMV BLD AUTO: 10.9 FL (ref 6–12)
PMV BLD AUTO: 11 FL (ref 6–12)
PMV BLD AUTO: 11 FL (ref 6–12)
PMV BLD AUTO: 11.1 FL (ref 6–12)
PMV BLD AUTO: 11.3 FL (ref 6–12)
PMV BLD AUTO: 11.3 FL (ref 6–12)
PMV BLD AUTO: 11.4 FL (ref 6–12)
PMV BLD AUTO: 11.4 FL (ref 6–12)
PMV BLD AUTO: 11.6 FL (ref 6–12)
PO2 BLDA: 103 MM HG (ref 83–108)
PO2 BLDA: 111 MM HG (ref 83–108)
PO2 BLDA: 199 MM HG (ref 83–108)
PO2 BLDA: 84.7 MM HG (ref 83–108)
PO2 TEMP ADJ BLD: ABNORMAL MM HG (ref 83–108)
POIKILOCYTOSIS BLD QL SMEAR: ABNORMAL
POIKILOCYTOSIS BLD QL SMEAR: ABNORMAL
POLYCHROMASIA BLD QL SMEAR: ABNORMAL
POTASSIUM BLD-SCNC: 3.8 MMOL/L (ref 3.5–5.3)
POTASSIUM BLD-SCNC: 3.9 MMOL/L (ref 3.5–5.3)
POTASSIUM BLD-SCNC: 3.9 MMOL/L (ref 3.5–5.3)
POTASSIUM BLD-SCNC: 4.1 MMOL/L (ref 3.5–5.3)
POTASSIUM BLD-SCNC: 4.3 MMOL/L (ref 3.5–5.3)
POTASSIUM BLD-SCNC: 4.4 MMOL/L (ref 3.5–5.3)
POTASSIUM BLD-SCNC: 4.5 MMOL/L (ref 3.5–5.3)
POTASSIUM BLD-SCNC: 4.6 MMOL/L (ref 3.5–5.3)
POTASSIUM BLDA-SCNC: 4.2 MMOL/L (ref 3.5–5.2)
PREALB SERPL-MCNC: 5 MG/DL (ref 18–36)
PROT SERPL-MCNC: 3.6 G/DL (ref 6.3–8.7)
PROT SERPL-MCNC: 4.4 G/DL (ref 6.3–8.7)
PROT SERPL-MCNC: 4.5 G/DL (ref 6.3–8.7)
PROT SERPL-MCNC: 4.6 G/DL (ref 6.3–8.7)
PROT SERPL-MCNC: 4.9 G/DL (ref 6.3–8.7)
PROT SERPL-MCNC: 6.1 G/DL (ref 6.3–8.7)
PROT SERPL-MCNC: 6.2 G/DL (ref 6.3–8.7)
PROT SERPL-MCNC: 6.5 G/DL (ref 6.3–8.7)
PROT UR QL STRIP: ABNORMAL
PROT UR QL STRIP: ABNORMAL
PROT UR QL STRIP: NEGATIVE
PROTHROMBIN TIME: 19.7 SECONDS (ref 11.9–14.6)
PSV: 10 CMH2O
PSV: 10 CMH2O
PSV: 12 CMH2O
RBC # BLD AUTO: 2.98 10*6/MM3 (ref 4.2–5.4)
RBC # BLD AUTO: 3.07 10*6/MM3 (ref 4.2–5.4)
RBC # BLD AUTO: 3.56 10*6/MM3 (ref 4.2–5.4)
RBC # BLD AUTO: 3.59 10*6/MM3 (ref 4.2–5.4)
RBC # BLD AUTO: 3.59 10*6/MM3 (ref 4.2–5.4)
RBC # BLD AUTO: 3.69 10*6/MM3 (ref 4.2–5.4)
RBC # BLD AUTO: 4.11 10*6/MM3 (ref 4.2–5.4)
RBC # BLD AUTO: 4.24 10*6/MM3 (ref 4.2–5.4)
RBC # BLD AUTO: 4.39 10*6/MM3 (ref 4.2–5.4)
RBC # BLD AUTO: 4.45 10*6/MM3 (ref 4.2–5.4)
RBC # BLD AUTO: 4.47 10*6/MM3 (ref 4.2–5.4)
RBC # BLD AUTO: 4.77 10*6/MM3 (ref 4.2–5.4)
RBC # BLD AUTO: 4.81 10*6/MM3 (ref 4.2–5.4)
RBC # UR: ABNORMAL /HPF
REF LAB TEST METHOD: ABNORMAL
RENAL EPI CELLS #/AREA URNS HPF: ABNORMAL /HPF
RH BLD: POSITIVE
RV RNA STL NAA+PROBE: NOT DETECTED
SALMONELLA DNA SPEC QL NAA+PROBE: NOT DETECTED
SAO2 % BLDCOA: 100 % (ref 94–99)
SAO2 % BLDCOA: 97 % (ref 94–99)
SAO2 % BLDCOA: 98.7 % (ref 94–99)
SAO2 % BLDCOA: 98.8 % (ref 94–99)
SAPO I+II+IV+V RNA STL QL NAA+NON-PROBE: NOT DETECTED
SET MECH RESP RATE: 10
SHIGELLA SP+EIEC IPAH STL QL NAA+PROBE: NOT DETECTED
SMALL PLATELETS BLD QL SMEAR: ABNORMAL
SODIUM BLD-SCNC: 132 MMOL/L (ref 135–145)
SODIUM BLD-SCNC: 135 MMOL/L (ref 135–145)
SODIUM BLD-SCNC: 136 MMOL/L (ref 135–145)
SODIUM BLD-SCNC: 137 MMOL/L (ref 135–145)
SODIUM BLD-SCNC: 138 MMOL/L (ref 135–145)
SODIUM BLD-SCNC: 139 MMOL/L (ref 135–145)
SODIUM BLD-SCNC: 139 MMOL/L (ref 135–145)
SODIUM BLD-SCNC: 140 MMOL/L (ref 135–145)
SODIUM BLD-SCNC: 141 MMOL/L (ref 135–145)
SODIUM BLD-SCNC: 141 MMOL/L (ref 135–145)
SODIUM BLDA-SCNC: 139 MMOL/L (ref 136–145)
SODIUM UR-SCNC: 30 MMOL/L (ref 30–90)
SP GR UR STRIP: 1.01 (ref 1–1.03)
SP GR UR STRIP: 1.02 (ref 1–1.03)
SP GR UR STRIP: 1.03 (ref 1–1.03)
SQUAMOUS #/AREA URNS HPF: ABNORMAL /HPF
T&S EXPIRATION DATE: NORMAL
TIBC SERPL-MCNC: 244 MCG/DL (ref 225–420)
TRIGL SERPL-MCNC: 59 MG/DL (ref 0–149)
TRIGL SERPL-MCNC: 87 MG/DL (ref 0–149)
TSH SERPL DL<=0.05 MIU/L-ACNC: 1.83 MIU/ML (ref 0.47–4.68)
URATE SERPL-MCNC: 6.8 MG/DL (ref 2.7–7.5)
UROBILINOGEN UR QL STRIP: ABNORMAL
V CHOLERAE DNA SPEC QL NAA+PROBE: NOT DETECTED
VARIANT LYMPHS NFR BLD MANUAL: 2.2 % (ref 0–5)
VENTILATOR MODE: ABNORMAL
VIBRIO DNA SPEC NAA+PROBE: NOT DETECTED
VT ON VENT VENT: 500 ML
WBC MORPH BLD: NORMAL
WBC NRBC COR # BLD: 10.43 10*3/MM3 (ref 4.8–10.8)
WBC NRBC COR # BLD: 11.08 10*3/MM3 (ref 4.8–10.8)
WBC NRBC COR # BLD: 12.67 10*3/MM3 (ref 4.8–10.8)
WBC NRBC COR # BLD: 15.24 10*3/MM3 (ref 4.8–10.8)
WBC NRBC COR # BLD: 15.32 10*3/MM3 (ref 4.8–10.8)
WBC NRBC COR # BLD: 18.48 10*3/MM3 (ref 4.8–10.8)
WBC NRBC COR # BLD: 18.92 10*3/MM3 (ref 4.8–10.8)
WBC NRBC COR # BLD: 25.57 10*3/MM3 (ref 4.8–10.8)
WBC NRBC COR # BLD: 33.04 10*3/MM3 (ref 4.8–10.8)
WBC NRBC COR # BLD: 36.97 10*3/MM3 (ref 4.8–10.8)
WBC NRBC COR # BLD: 37.63 10*3/MM3 (ref 4.8–10.8)
WBC NRBC COR # BLD: 43.68 10*3/MM3 (ref 4.8–10.8)
WBC NRBC COR # BLD: 9.88 10*3/MM3 (ref 4.8–10.8)
WBC UR QL AUTO: ABNORMAL /HPF
YEAST URNS QL MICRO: ABNORMAL /HPF
YEAST URNS QL MICRO: ABNORMAL /HPF
YERSINIA STL CULT: NOT DETECTED

## 2018-01-01 PROCEDURE — 83690 ASSAY OF LIPASE: CPT | Performed by: INTERNAL MEDICINE

## 2018-01-01 PROCEDURE — 97530 THERAPEUTIC ACTIVITIES: CPT

## 2018-01-01 PROCEDURE — 87088 URINE BACTERIA CULTURE: CPT | Performed by: NURSE PRACTITIONER

## 2018-01-01 PROCEDURE — 94799 UNLISTED PULMONARY SVC/PX: CPT

## 2018-01-01 PROCEDURE — 36415 COLL VENOUS BLD VENIPUNCTURE: CPT | Performed by: NURSE PRACTITIONER

## 2018-01-01 PROCEDURE — 86850 RBC ANTIBODY SCREEN: CPT | Performed by: ORTHOPAEDIC SURGERY

## 2018-01-01 PROCEDURE — 87088 URINE BACTERIA CULTURE: CPT | Performed by: INTERNAL MEDICINE

## 2018-01-01 PROCEDURE — 25010000002 ENOXAPARIN PER 10 MG: Performed by: FAMILY MEDICINE

## 2018-01-01 PROCEDURE — 87086 URINE CULTURE/COLONY COUNT: CPT | Performed by: NURSE PRACTITIONER

## 2018-01-01 PROCEDURE — 80053 COMPREHEN METABOLIC PANEL: CPT | Performed by: NURSE PRACTITIONER

## 2018-01-01 PROCEDURE — 25010000002 MORPHINE PER 10 MG: Performed by: SPECIALIST

## 2018-01-01 PROCEDURE — 80053 COMPREHEN METABOLIC PANEL: CPT | Performed by: EMERGENCY MEDICINE

## 2018-01-01 PROCEDURE — 84134 ASSAY OF PREALBUMIN: CPT | Performed by: SPECIALIST

## 2018-01-01 PROCEDURE — 82150 ASSAY OF AMYLASE: CPT | Performed by: INTERNAL MEDICINE

## 2018-01-01 PROCEDURE — 85025 COMPLETE CBC W/AUTO DIFF WBC: CPT | Performed by: FAMILY MEDICINE

## 2018-01-01 PROCEDURE — 97110 THERAPEUTIC EXERCISES: CPT

## 2018-01-01 PROCEDURE — 94760 N-INVAS EAR/PLS OXIMETRY 1: CPT

## 2018-01-01 PROCEDURE — 88307 TISSUE EXAM BY PATHOLOGIST: CPT | Performed by: SPECIALIST

## 2018-01-01 PROCEDURE — 25010000002 FENTANYL CITRATE (PF) 250 MCG/5ML SOLUTION: Performed by: NURSE ANESTHETIST, CERTIFIED REGISTERED

## 2018-01-01 PROCEDURE — C1713 ANCHOR/SCREW BN/BN,TIS/BN: HCPCS | Performed by: ORTHOPAEDIC SURGERY

## 2018-01-01 PROCEDURE — 84550 ASSAY OF BLOOD/URIC ACID: CPT | Performed by: INTERNAL MEDICINE

## 2018-01-01 PROCEDURE — 5A1945Z RESPIRATORY VENTILATION, 24-96 CONSECUTIVE HOURS: ICD-10-PCS | Performed by: INTERNAL MEDICINE

## 2018-01-01 PROCEDURE — 85014 HEMATOCRIT: CPT | Performed by: ORTHOPAEDIC SURGERY

## 2018-01-01 PROCEDURE — 73552 X-RAY EXAM OF FEMUR 2/>: CPT

## 2018-01-01 PROCEDURE — 87086 URINE CULTURE/COLONY COUNT: CPT | Performed by: EMERGENCY MEDICINE

## 2018-01-01 PROCEDURE — 25010000002 LORAZEPAM PER 2 MG: Performed by: INTERNAL MEDICINE

## 2018-01-01 PROCEDURE — 85025 COMPLETE CBC W/AUTO DIFF WBC: CPT | Performed by: NURSE PRACTITIONER

## 2018-01-01 PROCEDURE — 25010000002 ALBUMIN HUMAN 5% PER 50 ML: Performed by: NURSE ANESTHETIST, CERTIFIED REGISTERED

## 2018-01-01 PROCEDURE — 87186 SC STD MICRODIL/AGAR DIL: CPT | Performed by: INTERNAL MEDICINE

## 2018-01-01 PROCEDURE — 25010000002 ENOXAPARIN PER 10 MG: Performed by: SPECIALIST

## 2018-01-01 PROCEDURE — 80061 LIPID PANEL: CPT | Performed by: FAMILY MEDICINE

## 2018-01-01 PROCEDURE — 85007 BL SMEAR W/DIFF WBC COUNT: CPT | Performed by: FAMILY MEDICINE

## 2018-01-01 PROCEDURE — 94003 VENT MGMT INPAT SUBQ DAY: CPT

## 2018-01-01 PROCEDURE — 25010000002 NEOSTIGMINE PER 0.5 MG: Performed by: NURSE ANESTHETIST, CERTIFIED REGISTERED

## 2018-01-01 PROCEDURE — L1830 KO IMMOB CANVAS LONG PRE OTS: HCPCS | Performed by: ORTHOPAEDIC SURGERY

## 2018-01-01 PROCEDURE — 83540 ASSAY OF IRON: CPT | Performed by: FAMILY MEDICINE

## 2018-01-01 PROCEDURE — 82465 ASSAY BLD/SERUM CHOLESTEROL: CPT | Performed by: SPECIALIST

## 2018-01-01 PROCEDURE — 80048 BASIC METABOLIC PNL TOTAL CA: CPT | Performed by: NURSE PRACTITIONER

## 2018-01-01 PROCEDURE — 0DTF0ZZ RESECTION OF RIGHT LARGE INTESTINE, OPEN APPROACH: ICD-10-PCS | Performed by: INTERNAL MEDICINE

## 2018-01-01 PROCEDURE — 93005 ELECTROCARDIOGRAM TRACING: CPT | Performed by: FAMILY MEDICINE

## 2018-01-01 PROCEDURE — 25010000002 HYDROMORPHONE PER 4 MG: Performed by: INTERNAL MEDICINE

## 2018-01-01 PROCEDURE — 85018 HEMOGLOBIN: CPT | Performed by: ORTHOPAEDIC SURGERY

## 2018-01-01 PROCEDURE — 82803 BLOOD GASES ANY COMBINATION: CPT

## 2018-01-01 PROCEDURE — 25010000002 MORPHINE PER 10 MG: Performed by: INTERNAL MEDICINE

## 2018-01-01 PROCEDURE — 94770: CPT

## 2018-01-01 PROCEDURE — 81001 URINALYSIS AUTO W/SCOPE: CPT | Performed by: EMERGENCY MEDICINE

## 2018-01-01 PROCEDURE — 84100 ASSAY OF PHOSPHORUS: CPT | Performed by: SPECIALIST

## 2018-01-01 PROCEDURE — 80053 COMPREHEN METABOLIC PANEL: CPT | Performed by: INTERNAL MEDICINE

## 2018-01-01 PROCEDURE — 86140 C-REACTIVE PROTEIN: CPT | Performed by: SPECIALIST

## 2018-01-01 PROCEDURE — 94640 AIRWAY INHALATION TREATMENT: CPT

## 2018-01-01 PROCEDURE — G8979 MOBILITY GOAL STATUS: HCPCS

## 2018-01-01 PROCEDURE — 97535 SELF CARE MNGMENT TRAINING: CPT

## 2018-01-01 PROCEDURE — 99203 OFFICE O/P NEW LOW 30 MIN: CPT | Performed by: UROLOGY

## 2018-01-01 PROCEDURE — 25010000002 MEROPENEM: Performed by: INTERNAL MEDICINE

## 2018-01-01 PROCEDURE — 82570 ASSAY OF URINE CREATININE: CPT | Performed by: INTERNAL MEDICINE

## 2018-01-01 PROCEDURE — 82306 VITAMIN D 25 HYDROXY: CPT | Performed by: INTERNAL MEDICINE

## 2018-01-01 PROCEDURE — P9047 ALBUMIN (HUMAN), 25%, 50ML: HCPCS | Performed by: SPECIALIST

## 2018-01-01 PROCEDURE — 84443 ASSAY THYROID STIM HORMONE: CPT | Performed by: FAMILY MEDICINE

## 2018-01-01 PROCEDURE — 81001 URINALYSIS AUTO W/SCOPE: CPT | Performed by: INTERNAL MEDICINE

## 2018-01-01 PROCEDURE — 80048 BASIC METABOLIC PNL TOTAL CA: CPT | Performed by: ORTHOPAEDIC SURGERY

## 2018-01-01 PROCEDURE — 25010000002 ERTAPENEM PER 500 MG: Performed by: FAMILY MEDICINE

## 2018-01-01 PROCEDURE — 94002 VENT MGMT INPAT INIT DAY: CPT

## 2018-01-01 PROCEDURE — G8987 SELF CARE CURRENT STATUS: HCPCS | Performed by: OCCUPATIONAL THERAPIST

## 2018-01-01 PROCEDURE — 51798 US URINE CAPACITY MEASURE: CPT

## 2018-01-01 PROCEDURE — 87040 BLOOD CULTURE FOR BACTERIA: CPT | Performed by: INTERNAL MEDICINE

## 2018-01-01 PROCEDURE — 84478 ASSAY OF TRIGLYCERIDES: CPT | Performed by: SPECIALIST

## 2018-01-01 PROCEDURE — P9041 ALBUMIN (HUMAN),5%, 50ML: HCPCS | Performed by: NURSE ANESTHETIST, CERTIFIED REGISTERED

## 2018-01-01 PROCEDURE — 85651 RBC SED RATE NONAUTOMATED: CPT | Performed by: SPECIALIST

## 2018-01-01 PROCEDURE — 82805 BLOOD GASES W/O2 SATURATION: CPT

## 2018-01-01 PROCEDURE — 25010000002 PROPOFOL 10 MG/ML EMULSION: Performed by: NURSE ANESTHETIST, CERTIFIED REGISTERED

## 2018-01-01 PROCEDURE — 25010000002 MEROPENEM PER 100 MG: Performed by: INTERNAL MEDICINE

## 2018-01-01 PROCEDURE — 82330 ASSAY OF CALCIUM: CPT

## 2018-01-01 PROCEDURE — 51702 INSERT TEMP BLADDER CATH: CPT

## 2018-01-01 PROCEDURE — P9612 CATHETERIZE FOR URINE SPEC: HCPCS

## 2018-01-01 PROCEDURE — 85610 PROTHROMBIN TIME: CPT | Performed by: SPECIALIST

## 2018-01-01 PROCEDURE — 36600 WITHDRAWAL OF ARTERIAL BLOOD: CPT

## 2018-01-01 PROCEDURE — 0QSC04Z REPOSITION LEFT LOWER FEMUR WITH INTERNAL FIXATION DEVICE, OPEN APPROACH: ICD-10-PCS | Performed by: ORTHOPAEDIC SURGERY

## 2018-01-01 PROCEDURE — 25010000002 ONDANSETRON PER 1 MG: Performed by: FAMILY MEDICINE

## 2018-01-01 PROCEDURE — 25010000002 LEVOFLOXACIN PER 250 MG: Performed by: EMERGENCY MEDICINE

## 2018-01-01 PROCEDURE — G8979 MOBILITY GOAL STATUS: HCPCS | Performed by: PHYSICAL THERAPIST

## 2018-01-01 PROCEDURE — 92526 ORAL FUNCTION THERAPY: CPT

## 2018-01-01 PROCEDURE — 87999 UNLISTED MICROBIOLOGY PX: CPT | Performed by: EMERGENCY MEDICINE

## 2018-01-01 PROCEDURE — G8978 MOBILITY CURRENT STATUS: HCPCS

## 2018-01-01 PROCEDURE — 25010000002 SUCCINYLCHOLINE PER 20 MG: Performed by: NURSE ANESTHETIST, CERTIFIED REGISTERED

## 2018-01-01 PROCEDURE — 71045 X-RAY EXAM CHEST 1 VIEW: CPT

## 2018-01-01 PROCEDURE — 74018 RADEX ABDOMEN 1 VIEW: CPT

## 2018-01-01 PROCEDURE — 87070 CULTURE OTHR SPECIMN AEROBIC: CPT | Performed by: SPECIALIST

## 2018-01-01 PROCEDURE — 85007 BL SMEAR W/DIFF WBC COUNT: CPT

## 2018-01-01 PROCEDURE — 25010000002 ERTAPENEM PER 500 MG: Performed by: INTERNAL MEDICINE

## 2018-01-01 PROCEDURE — 25010000002 DEXAMETHASONE PER 1 MG: Performed by: NURSE ANESTHETIST, CERTIFIED REGISTERED

## 2018-01-01 PROCEDURE — 87186 SC STD MICRODIL/AGAR DIL: CPT | Performed by: NURSE PRACTITIONER

## 2018-01-01 PROCEDURE — 0 IOHEXOL 300 MG/ML SOLUTION: Performed by: SPECIALIST

## 2018-01-01 PROCEDURE — 83550 IRON BINDING TEST: CPT | Performed by: FAMILY MEDICINE

## 2018-01-01 PROCEDURE — 83735 ASSAY OF MAGNESIUM: CPT | Performed by: SPECIALIST

## 2018-01-01 PROCEDURE — 82962 GLUCOSE BLOOD TEST: CPT

## 2018-01-01 PROCEDURE — 25010000002 ONDANSETRON PER 1 MG: Performed by: NURSE ANESTHETIST, CERTIFIED REGISTERED

## 2018-01-01 PROCEDURE — C1751 CATH, INF, PER/CENT/MIDLINE: HCPCS

## 2018-01-01 PROCEDURE — 86900 BLOOD TYPING SEROLOGIC ABO: CPT | Performed by: ORTHOPAEDIC SURGERY

## 2018-01-01 PROCEDURE — 25010000002 ENOXAPARIN PER 10 MG: Performed by: NURSE PRACTITIONER

## 2018-01-01 PROCEDURE — 99233 SBSQ HOSP IP/OBS HIGH 50: CPT | Performed by: INTERNAL MEDICINE

## 2018-01-01 PROCEDURE — 80053 COMPREHEN METABOLIC PANEL: CPT | Performed by: SPECIALIST

## 2018-01-01 PROCEDURE — 97161 PT EVAL LOW COMPLEX 20 MIN: CPT | Performed by: PHYSICAL THERAPIST

## 2018-01-01 PROCEDURE — 25010000002 FENTANYL CITRATE (PF) 100 MCG/2ML SOLUTION: Performed by: NURSE ANESTHETIST, CERTIFIED REGISTERED

## 2018-01-01 PROCEDURE — 82375 ASSAY CARBOXYHB QUANT: CPT

## 2018-01-01 PROCEDURE — G8988 SELF CARE GOAL STATUS: HCPCS

## 2018-01-01 PROCEDURE — 63710000001 INSULIN REGULAR HUMAN PER 5 UNITS: Performed by: SPECIALIST

## 2018-01-01 PROCEDURE — 25010000002 LEVOFLOXACIN PER 250 MG: Performed by: NURSE PRACTITIONER

## 2018-01-01 PROCEDURE — 81001 URINALYSIS AUTO W/SCOPE: CPT

## 2018-01-01 PROCEDURE — 25010000002 CEFOXITIN PER 1 G: Performed by: SPECIALIST

## 2018-01-01 PROCEDURE — 87077 CULTURE AEROBIC IDENTIFY: CPT | Performed by: EMERGENCY MEDICINE

## 2018-01-01 PROCEDURE — G8997 SWALLOW GOAL STATUS: HCPCS

## 2018-01-01 PROCEDURE — 25010000002 ALBUMIN HUMAN 25% PER 50 ML: Performed by: SPECIALIST

## 2018-01-01 PROCEDURE — G8988 SELF CARE GOAL STATUS: HCPCS | Performed by: OCCUPATIONAL THERAPIST

## 2018-01-01 PROCEDURE — 85027 COMPLETE CBC AUTOMATED: CPT | Performed by: SPECIALIST

## 2018-01-01 PROCEDURE — 85060 BLOOD SMEAR INTERPRETATION: CPT | Performed by: INTERNAL MEDICINE

## 2018-01-01 PROCEDURE — 63710000001 INSULIN LISPRO (HUMAN) PER 5 UNITS: Performed by: SPECIALIST

## 2018-01-01 PROCEDURE — 25010000002 LEVOFLOXACIN PER 250 MG: Performed by: INTERNAL MEDICINE

## 2018-01-01 PROCEDURE — 25010000003 MORPHINE PER 100 MG: Performed by: INTERNAL MEDICINE

## 2018-01-01 PROCEDURE — 74176 CT ABD & PELVIS W/O CONTRAST: CPT

## 2018-01-01 PROCEDURE — 83050 HGB METHEMOGLOBIN QUAN: CPT

## 2018-01-01 PROCEDURE — 25010000002 ONDANSETRON PER 1 MG: Performed by: ORTHOPAEDIC SURGERY

## 2018-01-01 PROCEDURE — 92610 EVALUATE SWALLOWING FUNCTION: CPT

## 2018-01-01 PROCEDURE — 81001 URINALYSIS AUTO W/SCOPE: CPT | Performed by: NURSE PRACTITIONER

## 2018-01-01 PROCEDURE — 83605 ASSAY OF LACTIC ACID: CPT | Performed by: EMERGENCY MEDICINE

## 2018-01-01 PROCEDURE — G8996 SWALLOW CURRENT STATUS: HCPCS

## 2018-01-01 PROCEDURE — 25010000002 ENOXAPARIN PER 10 MG: Performed by: INTERNAL MEDICINE

## 2018-01-01 PROCEDURE — G8987 SELF CARE CURRENT STATUS: HCPCS

## 2018-01-01 PROCEDURE — 85027 COMPLETE CBC AUTOMATED: CPT

## 2018-01-01 PROCEDURE — 25010000002 IOPAMIDOL 61 % SOLUTION: Performed by: INTERNAL MEDICINE

## 2018-01-01 PROCEDURE — 87075 CULTR BACTERIA EXCEPT BLOOD: CPT | Performed by: SPECIALIST

## 2018-01-01 PROCEDURE — 99232 SBSQ HOSP IP/OBS MODERATE 35: CPT | Performed by: INTERNAL MEDICINE

## 2018-01-01 PROCEDURE — 70450 CT HEAD/BRAIN W/O DYE: CPT

## 2018-01-01 PROCEDURE — 93010 ELECTROCARDIOGRAM REPORT: CPT | Performed by: INTERNAL MEDICINE

## 2018-01-01 PROCEDURE — 86901 BLOOD TYPING SEROLOGIC RH(D): CPT | Performed by: ORTHOPAEDIC SURGERY

## 2018-01-01 PROCEDURE — 85025 COMPLETE CBC W/AUTO DIFF WBC: CPT | Performed by: EMERGENCY MEDICINE

## 2018-01-01 PROCEDURE — C1765 ADHESION BARRIER: HCPCS | Performed by: SPECIALIST

## 2018-01-01 PROCEDURE — 83605 ASSAY OF LACTIC ACID: CPT | Performed by: INTERNAL MEDICINE

## 2018-01-01 PROCEDURE — 0D1B0Z4 BYPASS ILEUM TO CUTANEOUS, OPEN APPROACH: ICD-10-PCS | Performed by: INTERNAL MEDICINE

## 2018-01-01 PROCEDURE — 80048 BASIC METABOLIC PNL TOTAL CA: CPT | Performed by: FAMILY MEDICINE

## 2018-01-01 PROCEDURE — 85027 COMPLETE CBC AUTOMATED: CPT | Performed by: NURSE PRACTITIONER

## 2018-01-01 PROCEDURE — 87086 URINE CULTURE/COLONY COUNT: CPT | Performed by: INTERNAL MEDICINE

## 2018-01-01 PROCEDURE — 87040 BLOOD CULTURE FOR BACTERIA: CPT | Performed by: EMERGENCY MEDICINE

## 2018-01-01 PROCEDURE — 83605 ASSAY OF LACTIC ACID: CPT | Performed by: NURSE PRACTITIONER

## 2018-01-01 PROCEDURE — G8978 MOBILITY CURRENT STATUS: HCPCS | Performed by: PHYSICAL THERAPIST

## 2018-01-01 PROCEDURE — 76000 FLUOROSCOPY <1 HR PHYS/QHP: CPT

## 2018-01-01 PROCEDURE — 25010000002 ERTAPENEM PER 500 MG: Performed by: NURSE PRACTITIONER

## 2018-01-01 PROCEDURE — 80048 BASIC METABOLIC PNL TOTAL CA: CPT

## 2018-01-01 PROCEDURE — 74177 CT ABD & PELVIS W/CONTRAST: CPT

## 2018-01-01 PROCEDURE — 73560 X-RAY EXAM OF KNEE 1 OR 2: CPT

## 2018-01-01 PROCEDURE — 25010000002 MEROPENEM: Performed by: SPECIALIST

## 2018-01-01 PROCEDURE — 99285 EMERGENCY DEPT VISIT HI MDM: CPT

## 2018-01-01 PROCEDURE — C1769 GUIDE WIRE: HCPCS | Performed by: ORTHOPAEDIC SURGERY

## 2018-01-01 PROCEDURE — 97165 OT EVAL LOW COMPLEX 30 MIN: CPT

## 2018-01-01 PROCEDURE — 84300 ASSAY OF URINE SODIUM: CPT | Performed by: INTERNAL MEDICINE

## 2018-01-01 PROCEDURE — 99222 1ST HOSP IP/OBS MODERATE 55: CPT | Performed by: INTERNAL MEDICINE

## 2018-01-01 PROCEDURE — 87086 URINE CULTURE/COLONY COUNT: CPT

## 2018-01-01 PROCEDURE — 97167 OT EVAL HIGH COMPLEX 60 MIN: CPT | Performed by: OCCUPATIONAL THERAPIST

## 2018-01-01 PROCEDURE — 25010000002 CALCIUM GLUCONATE PER 10 ML: Performed by: INTERNAL MEDICINE

## 2018-01-01 PROCEDURE — 87186 SC STD MICRODIL/AGAR DIL: CPT | Performed by: EMERGENCY MEDICINE

## 2018-01-01 PROCEDURE — 87205 SMEAR GRAM STAIN: CPT | Performed by: SPECIALIST

## 2018-01-01 PROCEDURE — 97161 PT EVAL LOW COMPLEX 20 MIN: CPT

## 2018-01-01 DEVICE — SCRW CANN VA LK 5X75MM: Type: IMPLANTABLE DEVICE | Status: FUNCTIONAL

## 2018-01-01 DEVICE — SCRW VA LK ST STRDRV 5X40MM: Type: IMPLANTABLE DEVICE | Status: FUNCTIONAL

## 2018-01-01 DEVICE — PROXIMATE LINEAR CUTTER RELOAD, BLUE, 75MM
Type: IMPLANTABLE DEVICE | Status: FUNCTIONAL
Brand: PROXIMATE

## 2018-01-01 DEVICE — SCRW CANN VA LK 5X60MM: Type: IMPLANTABLE DEVICE | Status: FUNCTIONAL

## 2018-01-01 DEVICE — SCRW VA LK ST STRDRV 5X36MM: Type: IMPLANTABLE DEVICE | Status: FUNCTIONAL

## 2018-01-01 DEVICE — IMPLANTABLE DEVICE: Type: IMPLANTABLE DEVICE | Status: FUNCTIONAL

## 2018-01-01 DEVICE — SCRW VA LK ST STRDRV 5X38MM: Type: IMPLANTABLE DEVICE | Status: FUNCTIONAL

## 2018-01-01 DEVICE — SCRW VA LK ST STRDRV 5X44MM: Type: IMPLANTABLE DEVICE | Status: FUNCTIONAL

## 2018-01-01 DEVICE — SCRW CANN VA LK 5X80MM: Type: IMPLANTABLE DEVICE | Status: FUNCTIONAL

## 2018-01-01 RX ORDER — MAGNESIUM HYDROXIDE 1200 MG/15ML
LIQUID ORAL AS NEEDED
Status: DISCONTINUED | OUTPATIENT
Start: 2018-01-01 | End: 2018-01-01 | Stop reason: HOSPADM

## 2018-01-01 RX ORDER — ISOSORBIDE MONONITRATE 60 MG/1
60 TABLET, EXTENDED RELEASE ORAL DAILY
Status: ON HOLD | COMMUNITY
End: 2018-01-01 | Stop reason: ALTCHOICE

## 2018-01-01 RX ORDER — IPRATROPIUM BROMIDE AND ALBUTEROL SULFATE 2.5; .5 MG/3ML; MG/3ML
3 SOLUTION RESPIRATORY (INHALATION) EVERY 4 HOURS PRN
Status: DISCONTINUED | OUTPATIENT
Start: 2018-01-01 | End: 2018-01-01

## 2018-01-01 RX ORDER — MONTELUKAST SODIUM 10 MG/1
10 TABLET ORAL DAILY
Status: DISCONTINUED | OUTPATIENT
Start: 2018-01-01 | End: 2018-01-01

## 2018-01-01 RX ORDER — ASPIRIN 81 MG/1
81 TABLET ORAL DAILY
Status: DISCONTINUED | OUTPATIENT
Start: 2018-01-01 | End: 2018-01-01

## 2018-01-01 RX ORDER — MONTELUKAST SODIUM 10 MG/1
10 TABLET ORAL DAILY
COMMUNITY

## 2018-01-01 RX ORDER — IPRATROPIUM BROMIDE AND ALBUTEROL SULFATE 2.5; .5 MG/3ML; MG/3ML
3 SOLUTION RESPIRATORY (INHALATION)
Status: DISCONTINUED | OUTPATIENT
Start: 2018-01-01 | End: 2018-01-01

## 2018-01-01 RX ORDER — FAMOTIDINE 20 MG/1
20 TABLET, FILM COATED ORAL EVERY 12 HOURS SCHEDULED
Status: DISCONTINUED | OUTPATIENT
Start: 2018-01-01 | End: 2018-01-01

## 2018-01-01 RX ORDER — MONTELUKAST SODIUM 10 MG/1
10 TABLET ORAL DAILY PRN
Status: ON HOLD | COMMUNITY
End: 2018-01-01

## 2018-01-01 RX ORDER — ALBUTEROL SULFATE 1.25 MG/3ML
1 SOLUTION RESPIRATORY (INHALATION)
Status: DISCONTINUED | OUTPATIENT
Start: 2018-01-01 | End: 2018-01-01

## 2018-01-01 RX ORDER — SIMETHICONE 80 MG
80 TABLET,CHEWABLE ORAL 4 TIMES DAILY PRN
Status: DISCONTINUED | OUTPATIENT
Start: 2018-01-01 | End: 2018-01-01

## 2018-01-01 RX ORDER — CLINDAMYCIN PHOSPHATE 900 MG/50ML
900 INJECTION INTRAVENOUS ONCE
Status: DISCONTINUED | OUTPATIENT
Start: 2018-01-01 | End: 2018-01-01 | Stop reason: HOSPADM

## 2018-01-01 RX ORDER — MEPERIDINE HYDROCHLORIDE 25 MG/ML
12.5 INJECTION INTRAMUSCULAR; INTRAVENOUS; SUBCUTANEOUS
Status: CANCELLED | OUTPATIENT
Start: 2018-01-01 | End: 2018-01-01

## 2018-01-01 RX ORDER — ONDANSETRON 2 MG/ML
4 INJECTION INTRAMUSCULAR; INTRAVENOUS AS NEEDED
Status: CANCELLED | OUTPATIENT
Start: 2018-01-01 | End: 2018-01-01

## 2018-01-01 RX ORDER — SODIUM CHLORIDE 0.9 % (FLUSH) 0.9 %
1-10 SYRINGE (ML) INJECTION AS NEEDED
Status: CANCELLED | OUTPATIENT
Start: 2018-01-01

## 2018-01-01 RX ORDER — ONDANSETRON 4 MG/1
4 TABLET, FILM COATED ORAL EVERY 6 HOURS PRN
Status: DISCONTINUED | OUTPATIENT
Start: 2018-01-01 | End: 2018-01-01 | Stop reason: HOSPADM

## 2018-01-01 RX ORDER — PHENYLEPHRINE HCL IN 0.9% NACL 0.8MG/10ML
SYRINGE (ML) INTRAVENOUS AS NEEDED
Status: DISCONTINUED | OUTPATIENT
Start: 2018-01-01 | End: 2018-01-01 | Stop reason: SURG

## 2018-01-01 RX ORDER — TRAMADOL HYDROCHLORIDE 50 MG/1
50 TABLET ORAL 2 TIMES DAILY PRN
Status: DISCONTINUED | OUTPATIENT
Start: 2018-01-01 | End: 2018-01-01 | Stop reason: HOSPADM

## 2018-01-01 RX ORDER — MEGESTROL ACETATE 40 MG/ML
400 SUSPENSION ORAL DAILY
Status: DISCONTINUED | OUTPATIENT
Start: 2018-01-01 | End: 2018-01-01 | Stop reason: HOSPADM

## 2018-01-01 RX ORDER — HYDROCODONE BITARTRATE AND ACETAMINOPHEN 7.5; 325 MG/1; MG/1
1 TABLET ORAL EVERY 4 HOURS PRN
Status: DISCONTINUED | OUTPATIENT
Start: 2018-01-01 | End: 2018-01-01

## 2018-01-01 RX ORDER — FAMOTIDINE 20 MG/1
20 TABLET, FILM COATED ORAL DAILY
Status: DISCONTINUED | OUTPATIENT
Start: 2018-01-01 | End: 2018-01-01

## 2018-01-01 RX ORDER — HYDRALAZINE HYDROCHLORIDE 20 MG/ML
5 INJECTION INTRAMUSCULAR; INTRAVENOUS
Status: DISCONTINUED | OUTPATIENT
Start: 2018-01-01 | End: 2018-01-01 | Stop reason: HOSPADM

## 2018-01-01 RX ORDER — MIDAZOLAM HYDROCHLORIDE 1 MG/ML
2 INJECTION INTRAMUSCULAR; INTRAVENOUS
Status: CANCELLED | OUTPATIENT
Start: 2018-01-01

## 2018-01-01 RX ORDER — OXYCODONE AND ACETAMINOPHEN 7.5; 325 MG/1; MG/1
2 TABLET ORAL ONCE AS NEEDED
Status: DISCONTINUED | OUTPATIENT
Start: 2018-01-01 | End: 2018-01-01 | Stop reason: HOSPADM

## 2018-01-01 RX ORDER — ESZOPICLONE 3 MG/1
3 TABLET, FILM COATED ORAL NIGHTLY
COMMUNITY

## 2018-01-01 RX ORDER — MORPHINE SULFATE 10 MG/ML
6 INJECTION INTRAMUSCULAR; INTRAVENOUS; SUBCUTANEOUS ONCE
Status: COMPLETED | OUTPATIENT
Start: 2018-01-01 | End: 2018-01-01

## 2018-01-01 RX ORDER — ESZOPICLONE 3 MG/1
3 TABLET, FILM COATED ORAL NIGHTLY PRN
Status: DISCONTINUED | OUTPATIENT
Start: 2018-01-01 | End: 2018-01-01

## 2018-01-01 RX ORDER — MEPERIDINE HYDROCHLORIDE 25 MG/ML
25 INJECTION INTRAMUSCULAR; INTRAVENOUS; SUBCUTANEOUS EVERY 6 HOURS PRN
Status: DISCONTINUED | OUTPATIENT
Start: 2018-01-01 | End: 2018-01-01

## 2018-01-01 RX ORDER — GLYCOPYRROLATE 0.2 MG/ML
INJECTION INTRAMUSCULAR; INTRAVENOUS AS NEEDED
Status: DISCONTINUED | OUTPATIENT
Start: 2018-01-01 | End: 2018-01-01 | Stop reason: SURG

## 2018-01-01 RX ORDER — PROPOFOL 10 MG/ML
VIAL (ML) INTRAVENOUS AS NEEDED
Status: DISCONTINUED | OUTPATIENT
Start: 2018-01-01 | End: 2018-01-01 | Stop reason: SURG

## 2018-01-01 RX ORDER — ALBUTEROL SULFATE 90 UG/1
2 AEROSOL, METERED RESPIRATORY (INHALATION) EVERY 4 HOURS PRN
COMMUNITY

## 2018-01-01 RX ORDER — OXYCODONE AND ACETAMINOPHEN 10; 325 MG/1; MG/1
1 TABLET ORAL ONCE AS NEEDED
Status: CANCELLED | OUTPATIENT
Start: 2018-01-01

## 2018-01-01 RX ORDER — ROPINIROLE 0.25 MG/1
0.5 TABLET, FILM COATED ORAL NIGHTLY
Status: DISCONTINUED | OUTPATIENT
Start: 2018-01-01 | End: 2018-01-01 | Stop reason: HOSPADM

## 2018-01-01 RX ORDER — SODIUM CHLORIDE, SODIUM LACTATE, POTASSIUM CHLORIDE, CALCIUM CHLORIDE 600; 310; 30; 20 MG/100ML; MG/100ML; MG/100ML; MG/100ML
INJECTION, SOLUTION INTRAVENOUS CONTINUOUS PRN
Status: DISCONTINUED | OUTPATIENT
Start: 2018-01-01 | End: 2018-01-01 | Stop reason: SURG

## 2018-01-01 RX ORDER — TRAMADOL HYDROCHLORIDE 50 MG/1
50 TABLET ORAL EVERY 6 HOURS PRN
Status: DISCONTINUED | OUTPATIENT
Start: 2018-01-01 | End: 2018-01-01

## 2018-01-01 RX ORDER — ONDANSETRON 2 MG/ML
4 INJECTION INTRAMUSCULAR; INTRAVENOUS EVERY 6 HOURS PRN
Status: DISCONTINUED | OUTPATIENT
Start: 2018-01-01 | End: 2018-01-01

## 2018-01-01 RX ORDER — LIDOCAINE HYDROCHLORIDE 20 MG/ML
INJECTION, SOLUTION INFILTRATION; PERINEURAL AS NEEDED
Status: DISCONTINUED | OUTPATIENT
Start: 2018-01-01 | End: 2018-01-01 | Stop reason: SURG

## 2018-01-01 RX ORDER — SODIUM CHLORIDE 0.9 % (FLUSH) 0.9 %
3 SYRINGE (ML) INJECTION EVERY 12 HOURS SCHEDULED
Status: DISCONTINUED | OUTPATIENT
Start: 2018-01-01 | End: 2018-01-01

## 2018-01-01 RX ORDER — IPRATROPIUM BROMIDE AND ALBUTEROL SULFATE 2.5; .5 MG/3ML; MG/3ML
3 SOLUTION RESPIRATORY (INHALATION) ONCE AS NEEDED
Status: DISCONTINUED | OUTPATIENT
Start: 2018-01-01 | End: 2018-01-01 | Stop reason: HOSPADM

## 2018-01-01 RX ORDER — ROPINIROLE 0.5 MG/1
0.5 TABLET, FILM COATED ORAL NIGHTLY
COMMUNITY

## 2018-01-01 RX ORDER — PANTOPRAZOLE SODIUM 40 MG/1
40 TABLET, DELAYED RELEASE ORAL DAILY
Status: DISCONTINUED | OUTPATIENT
Start: 2018-01-01 | End: 2018-01-01 | Stop reason: ALTCHOICE

## 2018-01-01 RX ORDER — LORAZEPAM 2 MG/ML
0.5 INJECTION INTRAMUSCULAR NIGHTLY PRN
Status: DISCONTINUED | OUTPATIENT
Start: 2018-01-01 | End: 2018-01-01

## 2018-01-01 RX ORDER — FENTANYL CITRATE 50 UG/ML
INJECTION, SOLUTION INTRAMUSCULAR; INTRAVENOUS AS NEEDED
Status: DISCONTINUED | OUTPATIENT
Start: 2018-01-01 | End: 2018-01-01 | Stop reason: SURG

## 2018-01-01 RX ORDER — LORAZEPAM 0.5 MG/1
0.5 TABLET ORAL EVERY 6 HOURS PRN
Status: DISCONTINUED | OUTPATIENT
Start: 2018-01-01 | End: 2018-01-01

## 2018-01-01 RX ORDER — ALBUTEROL SULFATE 1.25 MG/3ML
1 SOLUTION RESPIRATORY (INHALATION) NIGHTLY PRN
Status: DISCONTINUED | OUTPATIENT
Start: 2018-01-01 | End: 2018-01-01

## 2018-01-01 RX ORDER — FAMOTIDINE 10 MG/ML
20 INJECTION, SOLUTION INTRAVENOUS EVERY 12 HOURS SCHEDULED
Status: DISCONTINUED | OUTPATIENT
Start: 2018-01-01 | End: 2018-01-01

## 2018-01-01 RX ORDER — PANTOPRAZOLE SODIUM 40 MG/1
40 TABLET, DELAYED RELEASE ORAL DAILY
Status: DISCONTINUED | OUTPATIENT
Start: 2018-01-01 | End: 2018-01-01

## 2018-01-01 RX ORDER — MIRTAZAPINE 15 MG/1
15 TABLET, FILM COATED ORAL NIGHTLY
Status: ON HOLD | COMMUNITY
End: 2018-01-01

## 2018-01-01 RX ORDER — TAMSULOSIN HYDROCHLORIDE 0.4 MG/1
0.4 CAPSULE ORAL DAILY
Status: DISCONTINUED | OUTPATIENT
Start: 2018-01-01 | End: 2018-01-01

## 2018-01-01 RX ORDER — ALBUTEROL SULFATE 1.25 MG/3ML
1 SOLUTION RESPIRATORY (INHALATION) 2 TIMES DAILY
COMMUNITY

## 2018-01-01 RX ORDER — PANTOPRAZOLE SODIUM 40 MG/10ML
40 INJECTION, POWDER, LYOPHILIZED, FOR SOLUTION INTRAVENOUS
Status: DISCONTINUED | OUTPATIENT
Start: 2018-01-01 | End: 2018-01-01 | Stop reason: HOSPADM

## 2018-01-01 RX ORDER — LABETALOL HYDROCHLORIDE 5 MG/ML
5 INJECTION, SOLUTION INTRAVENOUS
Status: CANCELLED | OUTPATIENT
Start: 2018-01-01

## 2018-01-01 RX ORDER — SODIUM CHLORIDE, SODIUM LACTATE, POTASSIUM CHLORIDE, CALCIUM CHLORIDE 600; 310; 30; 20 MG/100ML; MG/100ML; MG/100ML; MG/100ML
100 INJECTION, SOLUTION INTRAVENOUS CONTINUOUS
Status: CANCELLED | OUTPATIENT
Start: 2018-01-01

## 2018-01-01 RX ORDER — ERTAPENEM 1 G/1
1 INJECTION, POWDER, LYOPHILIZED, FOR SOLUTION INTRAMUSCULAR; INTRAVENOUS EVERY 24 HOURS
Qty: 6000 MG | Refills: 0
Start: 2018-01-01 | End: 2018-01-01

## 2018-01-01 RX ORDER — LIDOCAINE HYDROCHLORIDE 10 MG/ML
1 INJECTION, SOLUTION EPIDURAL; INFILTRATION; INTRACAUDAL; PERINEURAL ONCE
Status: DISCONTINUED | OUTPATIENT
Start: 2018-01-01 | End: 2018-01-01

## 2018-01-01 RX ORDER — MORPHINE SULFATE 2 MG/ML
2 INJECTION, SOLUTION INTRAMUSCULAR; INTRAVENOUS
Status: DISCONTINUED | OUTPATIENT
Start: 2018-01-01 | End: 2018-01-01

## 2018-01-01 RX ORDER — ALBUMIN (HUMAN) 12.5 G/50ML
25 SOLUTION INTRAVENOUS ONCE
Status: COMPLETED | OUTPATIENT
Start: 2018-01-01 | End: 2018-01-01

## 2018-01-01 RX ORDER — SODIUM CHLORIDE 0.9 % (FLUSH) 0.9 %
1-10 SYRINGE (ML) INJECTION AS NEEDED
Status: DISCONTINUED | OUTPATIENT
Start: 2018-01-01 | End: 2018-01-01 | Stop reason: HOSPADM

## 2018-01-01 RX ORDER — PANTOPRAZOLE SODIUM 40 MG/1
40 TABLET, DELAYED RELEASE ORAL DAILY
COMMUNITY

## 2018-01-01 RX ORDER — SACCHAROMYCES BOULARDII 250 MG
500 CAPSULE ORAL 2 TIMES DAILY
Status: DISCONTINUED | OUTPATIENT
Start: 2018-01-01 | End: 2018-01-01

## 2018-01-01 RX ORDER — METOCLOPRAMIDE HYDROCHLORIDE 5 MG/ML
5 INJECTION INTRAMUSCULAR; INTRAVENOUS
Status: DISCONTINUED | OUTPATIENT
Start: 2018-01-01 | End: 2018-01-01 | Stop reason: HOSPADM

## 2018-01-01 RX ORDER — ISOSORBIDE MONONITRATE 60 MG/1
60 TABLET, EXTENDED RELEASE ORAL DAILY
Status: ON HOLD | COMMUNITY
End: 2018-01-01

## 2018-01-01 RX ORDER — TRAMADOL HYDROCHLORIDE 50 MG/1
50 TABLET ORAL EVERY 6 HOURS PRN
Qty: 12 TABLET | Refills: 0 | Status: SHIPPED | OUTPATIENT
Start: 2018-01-01

## 2018-01-01 RX ORDER — ACETAMINOPHEN 325 MG/1
650 TABLET ORAL EVERY 4 HOURS PRN
COMMUNITY

## 2018-01-01 RX ORDER — BISACODYL 10 MG
10 SUPPOSITORY, RECTAL RECTAL ONCE
Status: DISCONTINUED | OUTPATIENT
Start: 2018-01-01 | End: 2018-01-01

## 2018-01-01 RX ORDER — ACETAMINOPHEN 325 MG/1
650 TABLET ORAL EVERY 4 HOURS PRN
Status: DISCONTINUED | OUTPATIENT
Start: 2018-01-01 | End: 2018-01-01

## 2018-01-01 RX ORDER — SODIUM CHLORIDE 0.9 % (FLUSH) 0.9 %
3-10 SYRINGE (ML) INJECTION AS NEEDED
Status: DISCONTINUED | OUTPATIENT
Start: 2018-01-01 | End: 2018-01-01 | Stop reason: HOSPADM

## 2018-01-01 RX ORDER — SUCCINYLCHOLINE CHLORIDE 20 MG/ML
INJECTION INTRAMUSCULAR; INTRAVENOUS AS NEEDED
Status: DISCONTINUED | OUTPATIENT
Start: 2018-01-01 | End: 2018-01-01 | Stop reason: SURG

## 2018-01-01 RX ORDER — MORPHINE SULFATE 1 MG/ML
1 INJECTION, SOLUTION INTRAVENOUS CONTINUOUS
Status: DISCONTINUED | OUTPATIENT
Start: 2018-01-01 | End: 2018-01-01 | Stop reason: HOSPADM

## 2018-01-01 RX ORDER — ROCURONIUM BROMIDE 10 MG/ML
INJECTION, SOLUTION INTRAVENOUS AS NEEDED
Status: DISCONTINUED | OUTPATIENT
Start: 2018-01-01 | End: 2018-01-01 | Stop reason: SURG

## 2018-01-01 RX ORDER — CLINDAMYCIN PHOSPHATE 900 MG/50ML
900 INJECTION INTRAVENOUS EVERY 8 HOURS
Status: COMPLETED | OUTPATIENT
Start: 2018-01-01 | End: 2018-01-01

## 2018-01-01 RX ORDER — ONDANSETRON 4 MG/1
8 TABLET, ORALLY DISINTEGRATING ORAL EVERY 6 HOURS PRN
Status: DISCONTINUED | OUTPATIENT
Start: 2018-01-01 | End: 2018-01-01

## 2018-01-01 RX ORDER — ACETAMINOPHEN 325 MG/1
650 TABLET ORAL EVERY 4 HOURS PRN
Status: DISCONTINUED | OUTPATIENT
Start: 2018-01-01 | End: 2018-01-01 | Stop reason: SDUPTHER

## 2018-01-01 RX ORDER — SODIUM CHLORIDE 9 MG/ML
70 INJECTION, SOLUTION INTRAVENOUS CONTINUOUS
Status: DISCONTINUED | OUTPATIENT
Start: 2018-01-01 | End: 2018-01-01

## 2018-01-01 RX ORDER — CALCIUM CARBONATE 200(500)MG
2 TABLET,CHEWABLE ORAL 2 TIMES DAILY PRN
Status: DISCONTINUED | OUTPATIENT
Start: 2018-01-01 | End: 2018-01-01 | Stop reason: HOSPADM

## 2018-01-01 RX ORDER — SUCRALFATE ORAL 1 G/10ML
1 SUSPENSION ORAL EVERY 6 HOURS SCHEDULED
Status: DISCONTINUED | OUTPATIENT
Start: 2018-01-01 | End: 2018-01-01

## 2018-01-01 RX ORDER — SODIUM CHLORIDE 0.9 % (FLUSH) 0.9 %
10 SYRINGE (ML) INJECTION AS NEEDED
Status: DISCONTINUED | OUTPATIENT
Start: 2018-01-01 | End: 2018-01-01 | Stop reason: HOSPADM

## 2018-01-01 RX ORDER — MORPHINE SULFATE 2 MG/ML
2 INJECTION, SOLUTION INTRAMUSCULAR; INTRAVENOUS
Status: CANCELLED | OUTPATIENT
Start: 2018-01-01 | End: 2018-01-01

## 2018-01-01 RX ORDER — ALBUTEROL SULFATE 1.25 MG/3ML
1 SOLUTION RESPIRATORY (INHALATION) 3 TIMES DAILY PRN
Status: DISCONTINUED | OUTPATIENT
Start: 2018-01-01 | End: 2018-01-01 | Stop reason: HOSPADM

## 2018-01-01 RX ORDER — NALOXONE HCL 0.4 MG/ML
0.4 VIAL (ML) INJECTION AS NEEDED
Status: DISCONTINUED | OUTPATIENT
Start: 2018-01-01 | End: 2018-01-01 | Stop reason: HOSPADM

## 2018-01-01 RX ORDER — ERTAPENEM 1 G/1
1 INJECTION, POWDER, LYOPHILIZED, FOR SOLUTION INTRAMUSCULAR; INTRAVENOUS EVERY 24 HOURS
Status: DISCONTINUED | OUTPATIENT
Start: 2018-01-01 | End: 2018-01-01 | Stop reason: HOSPADM

## 2018-01-01 RX ORDER — IPRATROPIUM BROMIDE AND ALBUTEROL SULFATE 2.5; .5 MG/3ML; MG/3ML
3 SOLUTION RESPIRATORY (INHALATION) ONCE AS NEEDED
Status: CANCELLED | OUTPATIENT
Start: 2018-01-01

## 2018-01-01 RX ORDER — MIDAZOLAM HYDROCHLORIDE 1 MG/ML
1 INJECTION INTRAMUSCULAR; INTRAVENOUS
Status: CANCELLED | OUTPATIENT
Start: 2018-01-01

## 2018-01-01 RX ORDER — IBUPROFEN 600 MG/1
600 TABLET ORAL ONCE AS NEEDED
Status: DISCONTINUED | OUTPATIENT
Start: 2018-01-01 | End: 2018-01-01

## 2018-01-01 RX ORDER — DOCUSATE SODIUM 100 MG/1
100 CAPSULE, LIQUID FILLED ORAL 2 TIMES DAILY
Status: DISCONTINUED | OUTPATIENT
Start: 2018-01-01 | End: 2018-01-01 | Stop reason: HOSPADM

## 2018-01-01 RX ORDER — HEPARIN SODIUM 5000 [USP'U]/ML
5000 INJECTION, SOLUTION INTRAVENOUS; SUBCUTANEOUS EVERY 12 HOURS SCHEDULED
Status: DISCONTINUED | OUTPATIENT
Start: 2018-01-01 | End: 2018-01-01

## 2018-01-01 RX ORDER — ESZOPICLONE 3 MG/1
3 TABLET, FILM COATED ORAL NIGHTLY PRN
Status: DISCONTINUED | OUTPATIENT
Start: 2018-01-01 | End: 2018-01-01 | Stop reason: HOSPADM

## 2018-01-01 RX ORDER — FENTANYL CITRATE 50 UG/ML
25 INJECTION, SOLUTION INTRAMUSCULAR; INTRAVENOUS AS NEEDED
Status: DISCONTINUED | OUTPATIENT
Start: 2018-01-01 | End: 2018-01-01 | Stop reason: HOSPADM

## 2018-01-01 RX ORDER — NALOXONE HCL 0.4 MG/ML
0.4 VIAL (ML) INJECTION
Status: DISCONTINUED | OUTPATIENT
Start: 2018-01-01 | End: 2018-01-01 | Stop reason: HOSPADM

## 2018-01-01 RX ORDER — ONDANSETRON 4 MG/1
4 TABLET, ORALLY DISINTEGRATING ORAL EVERY 6 HOURS PRN
Status: DISCONTINUED | OUTPATIENT
Start: 2018-01-01 | End: 2018-01-01 | Stop reason: HOSPADM

## 2018-01-01 RX ORDER — LORAZEPAM 1 MG/1
1 TABLET ORAL EVERY 6 HOURS PRN
Status: DISCONTINUED | OUTPATIENT
Start: 2018-01-01 | End: 2018-01-01

## 2018-01-01 RX ORDER — LABETALOL HYDROCHLORIDE 5 MG/ML
5 INJECTION, SOLUTION INTRAVENOUS
Status: DISCONTINUED | OUTPATIENT
Start: 2018-01-01 | End: 2018-01-01 | Stop reason: HOSPADM

## 2018-01-01 RX ORDER — SCOLOPAMINE TRANSDERMAL SYSTEM 1 MG/1
1 PATCH, EXTENDED RELEASE TRANSDERMAL
Status: DISCONTINUED | OUTPATIENT
Start: 2018-01-01 | End: 2018-01-01 | Stop reason: HOSPADM

## 2018-01-01 RX ORDER — LEVOFLOXACIN 5 MG/ML
250 INJECTION, SOLUTION INTRAVENOUS EVERY 24 HOURS
Status: DISCONTINUED | OUTPATIENT
Start: 2018-01-01 | End: 2018-01-01

## 2018-01-01 RX ORDER — LEVOFLOXACIN 5 MG/ML
500 INJECTION, SOLUTION INTRAVENOUS EVERY 24 HOURS
Status: DISCONTINUED | OUTPATIENT
Start: 2018-01-01 | End: 2018-01-01

## 2018-01-01 RX ORDER — CETIRIZINE HYDROCHLORIDE 10 MG/1
10 TABLET ORAL DAILY PRN
COMMUNITY

## 2018-01-01 RX ORDER — OXYCODONE AND ACETAMINOPHEN 10; 325 MG/1; MG/1
1 TABLET ORAL ONCE AS NEEDED
Status: DISCONTINUED | OUTPATIENT
Start: 2018-01-01 | End: 2018-01-01 | Stop reason: HOSPADM

## 2018-01-01 RX ORDER — SODIUM CHLORIDE 9 MG/ML
50 INJECTION, SOLUTION INTRAVENOUS CONTINUOUS
Status: DISCONTINUED | OUTPATIENT
Start: 2018-01-01 | End: 2018-01-01

## 2018-01-01 RX ORDER — BISACODYL 10 MG
10 SUPPOSITORY, RECTAL RECTAL DAILY PRN
Status: DISCONTINUED | OUTPATIENT
Start: 2018-01-01 | End: 2018-01-01 | Stop reason: HOSPADM

## 2018-01-01 RX ORDER — CYCLOSPORINE 0.5 MG/ML
1 EMULSION OPHTHALMIC EVERY 12 HOURS
COMMUNITY

## 2018-01-01 RX ORDER — SODIUM CHLORIDE 0.9 % (FLUSH) 0.9 %
3 SYRINGE (ML) INJECTION EVERY 12 HOURS SCHEDULED
Status: CANCELLED | OUTPATIENT
Start: 2018-01-01

## 2018-01-01 RX ORDER — ALUMINA, MAGNESIA, AND SIMETHICONE 2400; 2400; 240 MG/30ML; MG/30ML; MG/30ML
20 SUSPENSION ORAL ONCE
Status: COMPLETED | OUTPATIENT
Start: 2018-01-01 | End: 2018-01-01

## 2018-01-01 RX ORDER — DEXTROSE AND SODIUM CHLORIDE 5; .45 G/100ML; G/100ML
125 INJECTION, SOLUTION INTRAVENOUS CONTINUOUS
Status: DISCONTINUED | OUTPATIENT
Start: 2018-01-01 | End: 2018-01-01

## 2018-01-01 RX ORDER — SUCRALFATE ORAL 1 G/10ML
1 SUSPENSION ORAL
Status: DISCONTINUED | OUTPATIENT
Start: 2018-01-01 | End: 2018-01-01 | Stop reason: SDUPTHER

## 2018-01-01 RX ORDER — ONDANSETRON 2 MG/ML
4 INJECTION INTRAMUSCULAR; INTRAVENOUS EVERY 6 HOURS PRN
Status: DISCONTINUED | OUTPATIENT
Start: 2018-01-01 | End: 2018-01-01 | Stop reason: HOSPADM

## 2018-01-01 RX ORDER — PSEUDOEPHEDRINE HCL 30 MG
100 TABLET ORAL 2 TIMES DAILY
Start: 2018-01-01

## 2018-01-01 RX ORDER — MIRTAZAPINE 15 MG/1
15 TABLET, FILM COATED ORAL NIGHTLY
Status: DISCONTINUED | OUTPATIENT
Start: 2018-01-01 | End: 2018-01-01 | Stop reason: HOSPADM

## 2018-01-01 RX ORDER — ALBUMIN, HUMAN INJ 5% 5 %
SOLUTION INTRAVENOUS CONTINUOUS PRN
Status: DISCONTINUED | OUTPATIENT
Start: 2018-01-01 | End: 2018-01-01 | Stop reason: SURG

## 2018-01-01 RX ORDER — HYDRALAZINE HYDROCHLORIDE 20 MG/ML
5 INJECTION INTRAMUSCULAR; INTRAVENOUS
Status: CANCELLED | OUTPATIENT
Start: 2018-01-01

## 2018-01-01 RX ORDER — ATORVASTATIN CALCIUM 40 MG/1
40 TABLET, FILM COATED ORAL NIGHTLY
Status: DISCONTINUED | OUTPATIENT
Start: 2018-01-01 | End: 2018-01-01

## 2018-01-01 RX ORDER — ALBUTEROL SULFATE 1.25 MG/3ML
1 SOLUTION RESPIRATORY (INHALATION) NIGHTLY PRN
COMMUNITY

## 2018-01-01 RX ORDER — ONDANSETRON 2 MG/ML
4 INJECTION INTRAMUSCULAR; INTRAVENOUS ONCE AS NEEDED
Status: DISCONTINUED | OUTPATIENT
Start: 2018-01-01 | End: 2018-01-01 | Stop reason: HOSPADM

## 2018-01-01 RX ORDER — FLUMAZENIL 0.1 MG/ML
0.2 INJECTION INTRAVENOUS AS NEEDED
Status: DISCONTINUED | OUTPATIENT
Start: 2018-01-01 | End: 2018-01-01 | Stop reason: HOSPADM

## 2018-01-01 RX ORDER — FAMOTIDINE 10 MG/ML
20 INJECTION, SOLUTION INTRAVENOUS DAILY
Status: DISCONTINUED | OUTPATIENT
Start: 2018-01-01 | End: 2018-01-01

## 2018-01-01 RX ORDER — ISOSORBIDE MONONITRATE 60 MG/1
60 TABLET, EXTENDED RELEASE ORAL DAILY
Status: DISCONTINUED | OUTPATIENT
Start: 2018-01-01 | End: 2018-01-01 | Stop reason: HOSPADM

## 2018-01-01 RX ORDER — FENTANYL CITRATE 50 UG/ML
25 INJECTION, SOLUTION INTRAMUSCULAR; INTRAVENOUS AS NEEDED
Status: CANCELLED | OUTPATIENT
Start: 2018-01-01

## 2018-01-01 RX ORDER — MAGNESIUM CARB/ALUMINUM HYDROX 105-160MG
296 TABLET,CHEWABLE ORAL ONCE
Status: DISCONTINUED | OUTPATIENT
Start: 2018-01-01 | End: 2018-01-01 | Stop reason: HOSPADM

## 2018-01-01 RX ORDER — LEVOFLOXACIN 5 MG/ML
500 INJECTION, SOLUTION INTRAVENOUS ONCE
Status: COMPLETED | OUTPATIENT
Start: 2018-01-01 | End: 2018-01-01

## 2018-01-01 RX ORDER — MEPERIDINE HYDROCHLORIDE 25 MG/ML
12.5 INJECTION INTRAMUSCULAR; INTRAVENOUS; SUBCUTANEOUS
Status: DISCONTINUED | OUTPATIENT
Start: 2018-01-01 | End: 2018-01-01 | Stop reason: HOSPADM

## 2018-01-01 RX ORDER — METOCLOPRAMIDE HYDROCHLORIDE 5 MG/ML
5 INJECTION INTRAMUSCULAR; INTRAVENOUS
Status: CANCELLED | OUTPATIENT
Start: 2018-01-01

## 2018-01-01 RX ORDER — MONTELUKAST SODIUM 10 MG/1
10 TABLET ORAL DAILY
Status: DISCONTINUED | OUTPATIENT
Start: 2018-01-01 | End: 2018-01-01 | Stop reason: HOSPADM

## 2018-01-01 RX ORDER — FLUMAZENIL 0.1 MG/ML
0.2 INJECTION INTRAVENOUS AS NEEDED
Status: CANCELLED | OUTPATIENT
Start: 2018-01-01

## 2018-01-01 RX ORDER — LORAZEPAM 2 MG/ML
0.5 INJECTION INTRAMUSCULAR EVERY 6 HOURS PRN
Status: DISCONTINUED | OUTPATIENT
Start: 2018-01-01 | End: 2018-01-01

## 2018-01-01 RX ORDER — SUCRALFATE ORAL 1 G/10ML
1 SUSPENSION ORAL
COMMUNITY

## 2018-01-01 RX ORDER — LORAZEPAM 2 MG/ML
0.5 INJECTION INTRAMUSCULAR NIGHTLY
Status: DISCONTINUED | OUTPATIENT
Start: 2018-01-01 | End: 2018-01-01

## 2018-01-01 RX ORDER — BISACODYL 10 MG
10 SUPPOSITORY, RECTAL RECTAL DAILY
Status: DISCONTINUED | OUTPATIENT
Start: 2018-01-01 | End: 2018-01-01 | Stop reason: HOSPADM

## 2018-01-01 RX ORDER — MEGESTROL ACETATE 40 MG/ML
800 SUSPENSION ORAL DAILY
Status: DISCONTINUED | OUTPATIENT
Start: 2018-01-01 | End: 2018-01-01

## 2018-01-01 RX ORDER — SODIUM CHLORIDE 9 MG/ML
75 INJECTION, SOLUTION INTRAVENOUS CONTINUOUS
Status: DISCONTINUED | OUTPATIENT
Start: 2018-01-01 | End: 2018-01-01

## 2018-01-01 RX ORDER — OXYCODONE HYDROCHLORIDE AND ACETAMINOPHEN 5; 325 MG/1; MG/1
1 TABLET ORAL EVERY 4 HOURS PRN
Status: DISCONTINUED | OUTPATIENT
Start: 2018-01-01 | End: 2018-01-01 | Stop reason: HOSPADM

## 2018-01-01 RX ORDER — MEGESTROL ACETATE 40 MG/ML
400 SUSPENSION ORAL DAILY
Status: ON HOLD
Start: 2018-01-01 | End: 2018-01-01

## 2018-01-01 RX ORDER — HYDROMORPHONE HYDROCHLORIDE 1 MG/ML
0.5 INJECTION, SOLUTION INTRAMUSCULAR; INTRAVENOUS; SUBCUTANEOUS EVERY 6 HOURS PRN
Status: DISCONTINUED | OUTPATIENT
Start: 2018-01-01 | End: 2018-01-01 | Stop reason: HOSPADM

## 2018-01-01 RX ORDER — TRAMADOL HYDROCHLORIDE 50 MG/1
50 TABLET ORAL NIGHTLY
Status: ON HOLD | COMMUNITY
End: 2018-01-01

## 2018-01-01 RX ORDER — MAGNESIUM HYDROXIDE/ALUMINUM HYDROXICE/SIMETHICONE 120; 1200; 1200 MG/30ML; MG/30ML; MG/30ML
30 SUSPENSION ORAL EVERY 6 HOURS PRN
COMMUNITY

## 2018-01-01 RX ORDER — ONDANSETRON 2 MG/ML
4 INJECTION INTRAMUSCULAR; INTRAVENOUS AS NEEDED
Status: DISCONTINUED | OUTPATIENT
Start: 2018-01-01 | End: 2018-01-01 | Stop reason: HOSPADM

## 2018-01-01 RX ORDER — MORPHINE SULFATE 2 MG/ML
1 INJECTION, SOLUTION INTRAMUSCULAR; INTRAVENOUS EVERY 4 HOURS PRN
Status: DISCONTINUED | OUTPATIENT
Start: 2018-01-01 | End: 2018-01-01

## 2018-01-01 RX ORDER — ROPINIROLE 0.25 MG/1
0.5 TABLET, FILM COATED ORAL NIGHTLY
Status: DISCONTINUED | OUTPATIENT
Start: 2018-01-01 | End: 2018-01-01

## 2018-01-01 RX ORDER — MORPHINE SULFATE 2 MG/ML
2 INJECTION, SOLUTION INTRAMUSCULAR; INTRAVENOUS
Status: DISCONTINUED | OUTPATIENT
Start: 2018-01-01 | End: 2018-01-01 | Stop reason: HOSPADM

## 2018-01-01 RX ORDER — DEXAMETHASONE SODIUM PHOSPHATE 4 MG/ML
INJECTION, SOLUTION INTRA-ARTICULAR; INTRALESIONAL; INTRAMUSCULAR; INTRAVENOUS; SOFT TISSUE AS NEEDED
Status: DISCONTINUED | OUTPATIENT
Start: 2018-01-01 | End: 2018-01-01 | Stop reason: SURG

## 2018-01-01 RX ORDER — ACETAMINOPHEN 500 MG
1000 TABLET ORAL ONCE
Status: CANCELLED | OUTPATIENT
Start: 2018-01-01 | End: 2018-01-01

## 2018-01-01 RX ORDER — ONDANSETRON 2 MG/ML
INJECTION INTRAMUSCULAR; INTRAVENOUS AS NEEDED
Status: DISCONTINUED | OUTPATIENT
Start: 2018-01-01 | End: 2018-01-01 | Stop reason: SURG

## 2018-01-01 RX ORDER — IPRATROPIUM BROMIDE AND ALBUTEROL SULFATE 2.5; .5 MG/3ML; MG/3ML
3 SOLUTION RESPIRATORY (INHALATION)
Status: DISCONTINUED | OUTPATIENT
Start: 2018-01-01 | End: 2018-01-01 | Stop reason: HOSPADM

## 2018-01-01 RX ORDER — LEVOFLOXACIN 250 MG/1
250 TABLET ORAL DAILY
Qty: 5 TABLET | Refills: 0 | Status: SHIPPED | OUTPATIENT
Start: 2018-01-01 | End: 2018-01-01

## 2018-01-01 RX ORDER — FUROSEMIDE 10 MG/ML
40 INJECTION INTRAMUSCULAR; INTRAVENOUS EVERY 12 HOURS
Status: DISCONTINUED | OUTPATIENT
Start: 2018-01-01 | End: 2018-01-01

## 2018-01-01 RX ORDER — ALBUTEROL SULFATE 90 UG/1
2 AEROSOL, METERED RESPIRATORY (INHALATION) EVERY 4 HOURS PRN
Status: DISCONTINUED | OUTPATIENT
Start: 2018-01-01 | End: 2018-01-01 | Stop reason: SDUPTHER

## 2018-01-01 RX ORDER — FAMOTIDINE 20 MG/1
20 TABLET, FILM COATED ORAL 2 TIMES DAILY
Status: DISCONTINUED | OUTPATIENT
Start: 2018-01-01 | End: 2018-01-01

## 2018-01-01 RX ORDER — NALOXONE HCL 0.4 MG/ML
0.04 VIAL (ML) INJECTION AS NEEDED
Status: DISCONTINUED | OUTPATIENT
Start: 2018-01-01 | End: 2018-01-01 | Stop reason: HOSPADM

## 2018-01-01 RX ORDER — NALOXONE HCL 0.4 MG/ML
0.04 VIAL (ML) INJECTION AS NEEDED
Status: CANCELLED | OUTPATIENT
Start: 2018-01-01

## 2018-01-01 RX ORDER — CYCLOSPORINE 0.5 MG/ML
1 EMULSION OPHTHALMIC EVERY 12 HOURS SCHEDULED
Status: DISCONTINUED | OUTPATIENT
Start: 2018-01-01 | End: 2018-01-01

## 2018-01-01 RX ORDER — CLINDAMYCIN PHOSPHATE 900 MG/50ML
INJECTION INTRAVENOUS AS NEEDED
Status: DISCONTINUED | OUTPATIENT
Start: 2018-01-01 | End: 2018-01-01 | Stop reason: SURG

## 2018-01-01 RX ORDER — MORPHINE SULFATE 10 MG/ML
4 INJECTION INTRAMUSCULAR; INTRAVENOUS; SUBCUTANEOUS
Status: DISCONTINUED | OUTPATIENT
Start: 2018-01-01 | End: 2018-01-01

## 2018-01-01 RX ADMIN — SODIUM BICARBONATE 75 ML/HR: 84 INJECTION, SOLUTION INTRAVENOUS at 07:21

## 2018-01-01 RX ADMIN — ENOXAPARIN SODIUM 40 MG: 40 INJECTION SUBCUTANEOUS at 18:56

## 2018-01-01 RX ADMIN — METRONIDAZOLE 500 MG: 500 INJECTION, SOLUTION INTRAVENOUS at 11:57

## 2018-01-01 RX ADMIN — SODIUM BICARBONATE 50 MEQ: 84 INJECTION, SOLUTION INTRAVENOUS at 07:49

## 2018-01-01 RX ADMIN — DOCUSATE SODIUM 100 MG: 100 CAPSULE, LIQUID FILLED ORAL at 21:45

## 2018-01-01 RX ADMIN — LEVOFLOXACIN 500 MG: 5 INJECTION, SOLUTION INTRAVENOUS at 17:43

## 2018-01-01 RX ADMIN — ASCORBIC ACID, VITAMIN A PALMITATE, CHOLECALCIFEROL, THIAMINE HYDROCHLORIDE, RIBOFLAVIN-5 PHOSPHATE SODIUM, PYRIDOXINE HYDROCHLORIDE, NIACINAMIDE, DEXPANTHENOL, ALPHA-TOCOPHEROL ACETATE, VITAMIN K1, FOLIC ACID, BIOTIN, CYANOCOBALAMIN: 200; 3300; 200; 6; 3.6; 6; 40; 15; 10; 150; 600; 60; 5 INJECTION, SOLUTION INTRAVENOUS at 18:02

## 2018-01-01 RX ADMIN — VANCOMYCIN HYDROCHLORIDE 125 MG: KIT at 00:12

## 2018-01-01 RX ADMIN — ROCURONIUM BROMIDE 5 MG: 10 INJECTION INTRAVENOUS at 21:03

## 2018-01-01 RX ADMIN — ALBUMIN HUMAN 25 G: 0.25 SOLUTION INTRAVENOUS at 07:49

## 2018-01-01 RX ADMIN — DESMOPRESSIN ACETATE 40 MG: 0.2 TABLET ORAL at 22:50

## 2018-01-01 RX ADMIN — CYCLOSPORINE 1 DROP: 0.5 EMULSION OPHTHALMIC at 10:42

## 2018-01-01 RX ADMIN — SODIUM CHLORIDE 75 ML/HR: 9 INJECTION, SOLUTION INTRAVENOUS at 00:12

## 2018-01-01 RX ADMIN — DEXTROSE AND SODIUM CHLORIDE 125 ML/HR: 5; 450 INJECTION, SOLUTION INTRAVENOUS at 09:23

## 2018-01-01 RX ADMIN — MEGESTROL ACETATE 800 MG: 40 SUSPENSION ORAL at 10:43

## 2018-01-01 RX ADMIN — ASCORBIC ACID, VITAMIN A PALMITATE, CHOLECALCIFEROL, THIAMINE HYDROCHLORIDE, RIBOFLAVIN-5 PHOSPHATE SODIUM, PYRIDOXINE HYDROCHLORIDE, NIACINAMIDE, DEXPANTHENOL, ALPHA-TOCOPHEROL ACETATE, VITAMIN K1, FOLIC ACID, BIOTIN, CYANOCOBALAMIN: 200; 3300; 200; 6; 3.6; 6; 40; 15; 10; 150; 600; 60; 5 INJECTION, SOLUTION INTRAVENOUS at 17:58

## 2018-01-01 RX ADMIN — ENOXAPARIN SODIUM 30 MG: 30 INJECTION SUBCUTANEOUS at 18:23

## 2018-01-01 RX ADMIN — TRAMADOL HYDROCHLORIDE 50 MG: 50 TABLET, COATED ORAL at 00:45

## 2018-01-01 RX ADMIN — OXYCODONE HYDROCHLORIDE AND ACETAMINOPHEN 1 TABLET: 5; 325 TABLET ORAL at 05:26

## 2018-01-01 RX ADMIN — ONDANSETRON HYDROCHLORIDE 4 MG: 2 SOLUTION INTRAMUSCULAR; INTRAVENOUS at 11:30

## 2018-01-01 RX ADMIN — IPRATROPIUM BROMIDE AND ALBUTEROL SULFATE 3 ML: 2.5; .5 SOLUTION RESPIRATORY (INHALATION) at 23:41

## 2018-01-01 RX ADMIN — VANCOMYCIN HYDROCHLORIDE 125 MG: KIT at 05:40

## 2018-01-01 RX ADMIN — MORPHINE SULFATE: 25 INJECTION, SOLUTION, CONCENTRATE INTRAVENOUS at 00:25

## 2018-01-01 RX ADMIN — ROCURONIUM BROMIDE 10 MG: 10 INJECTION INTRAVENOUS at 22:20

## 2018-01-01 RX ADMIN — SODIUM CHLORIDE, POTASSIUM CHLORIDE, SODIUM LACTATE AND CALCIUM CHLORIDE 500 ML: 600; 310; 30; 20 INJECTION, SOLUTION INTRAVENOUS at 07:27

## 2018-01-01 RX ADMIN — APIXABAN 2.5 MG: 2.5 TABLET, FILM COATED ORAL at 20:36

## 2018-01-01 RX ADMIN — PANTOPRAZOLE SODIUM 40 MG: 40 TABLET, DELAYED RELEASE ORAL at 10:58

## 2018-01-01 RX ADMIN — ESZOPICLONE 3 MG: 3 TABLET, FILM COATED ORAL at 22:51

## 2018-01-01 RX ADMIN — DOCUSATE SODIUM 100 MG: 100 CAPSULE, LIQUID FILLED ORAL at 08:24

## 2018-01-01 RX ADMIN — ROPINIROLE HYDROCHLORIDE 0.5 MG: 0.25 TABLET, FILM COATED ORAL at 21:45

## 2018-01-01 RX ADMIN — SODIUM CHLORIDE 1 G: 900 INJECTION INTRAVENOUS at 18:57

## 2018-01-01 RX ADMIN — ALBUMIN HUMAN 25 G: 0.25 SOLUTION INTRAVENOUS at 11:56

## 2018-01-01 RX ADMIN — ROPINIROLE HYDROCHLORIDE 0.5 MG: 0.25 TABLET, FILM COATED ORAL at 01:27

## 2018-01-01 RX ADMIN — MONTELUKAST SODIUM 10 MG: 10 TABLET, FILM COATED ORAL at 10:43

## 2018-01-01 RX ADMIN — TRAMADOL HYDROCHLORIDE 50 MG: 50 TABLET, COATED ORAL at 19:06

## 2018-01-01 RX ADMIN — Medication 3 MG: at 22:57

## 2018-01-01 RX ADMIN — Medication 2 TABLET: at 04:53

## 2018-01-01 RX ADMIN — OXYCODONE HYDROCHLORIDE AND ACETAMINOPHEN 1 TABLET: 5; 325 TABLET ORAL at 03:38

## 2018-01-01 RX ADMIN — IPRATROPIUM BROMIDE AND ALBUTEROL SULFATE 3 ML: 2.5; .5 SOLUTION RESPIRATORY (INHALATION) at 06:26

## 2018-01-01 RX ADMIN — IPRATROPIUM BROMIDE AND ALBUTEROL SULFATE 3 ML: 2.5; .5 SOLUTION RESPIRATORY (INHALATION) at 15:45

## 2018-01-01 RX ADMIN — MIRTAZAPINE 15 MG: 15 TABLET, FILM COATED ORAL at 20:39

## 2018-01-01 RX ADMIN — DEXAMETHASONE SODIUM PHOSPHATE 4 MG: 4 INJECTION, SOLUTION INTRAMUSCULAR; INTRAVENOUS at 10:28

## 2018-01-01 RX ADMIN — MILK OF MAGNESIA 10 ML: 2400 CONCENTRATE ORAL at 20:39

## 2018-01-01 RX ADMIN — ASPIRIN 81 MG: 81 TABLET ORAL at 08:49

## 2018-01-01 RX ADMIN — FAMOTIDINE 20 MG: 20 TABLET, FILM COATED ORAL at 09:36

## 2018-01-01 RX ADMIN — Medication 80 MCG: at 10:55

## 2018-01-01 RX ADMIN — PANTOPRAZOLE SODIUM 40 MG: 40 TABLET, DELAYED RELEASE ORAL at 08:24

## 2018-01-01 RX ADMIN — MEROPENEM 500 MG: 500 INJECTION, POWDER, FOR SOLUTION INTRAVENOUS at 00:43

## 2018-01-01 RX ADMIN — FAMOTIDINE 20 MG: 20 TABLET, FILM COATED ORAL at 10:43

## 2018-01-01 RX ADMIN — ASPIRIN 81 MG: 81 TABLET ORAL at 09:19

## 2018-01-01 RX ADMIN — SODIUM CHLORIDE 500 ML: 9 INJECTION, SOLUTION INTRAVENOUS at 22:33

## 2018-01-01 RX ADMIN — MEGESTROL ACETATE 400 MG: 40 SUSPENSION ORAL at 10:58

## 2018-01-01 RX ADMIN — CYCLOSPORINE 1 DROP: 0.5 EMULSION OPHTHALMIC at 21:22

## 2018-01-01 RX ADMIN — NYSTATIN 500000 UNITS: 500000 SUSPENSION ORAL at 09:22

## 2018-01-01 RX ADMIN — LORAZEPAM 0.5 MG: 2 INJECTION INTRAMUSCULAR; INTRAVENOUS at 22:58

## 2018-01-01 RX ADMIN — SODIUM CHLORIDE 75 ML/HR: 9 INJECTION, SOLUTION INTRAVENOUS at 09:12

## 2018-01-01 RX ADMIN — OXYCODONE HYDROCHLORIDE AND ACETAMINOPHEN 1 TABLET: 5; 325 TABLET ORAL at 20:38

## 2018-01-01 RX ADMIN — VANCOMYCIN HYDROCHLORIDE 125 MG: KIT at 00:54

## 2018-01-01 RX ADMIN — GLYCOPYRROLATE 0.4 MG: 0.2 INJECTION, SOLUTION INTRAMUSCULAR; INTRAVENOUS at 22:57

## 2018-01-01 RX ADMIN — Medication 2 TABLET: at 20:36

## 2018-01-01 RX ADMIN — APIXABAN 2.5 MG: 2.5 TABLET, FILM COATED ORAL at 08:52

## 2018-01-01 RX ADMIN — FENTANYL CITRATE 50 MCG: 50 INJECTION INTRAMUSCULAR; INTRAVENOUS at 10:05

## 2018-01-01 RX ADMIN — VANCOMYCIN HYDROCHLORIDE 125 MG: KIT at 11:59

## 2018-01-01 RX ADMIN — ALBUTEROL SULFATE 1.25 MG: 1.25 SOLUTION RESPIRATORY (INHALATION) at 21:34

## 2018-01-01 RX ADMIN — SODIUM CHLORIDE 100 ML/HR: 9 INJECTION, SOLUTION INTRAVENOUS at 20:39

## 2018-01-01 RX ADMIN — MEPERIDINE HYDROCHLORIDE 25 MG: 25 INJECTION, SOLUTION INTRAMUSCULAR; INTRAVENOUS; SUBCUTANEOUS at 05:32

## 2018-01-01 RX ADMIN — FENTANYL CITRATE 25 MCG: 50 INJECTION, SOLUTION INTRAMUSCULAR; INTRAVENOUS at 13:03

## 2018-01-01 RX ADMIN — ONDANSETRON HYDROCHLORIDE 4 MG: 2 INJECTION, SOLUTION INTRAMUSCULAR; INTRAVENOUS at 01:07

## 2018-01-01 RX ADMIN — ESZOPICLONE 3 MG: 3 TABLET, FILM COATED ORAL at 21:46

## 2018-01-01 RX ADMIN — METRONIDAZOLE 500 MG: 500 INJECTION, SOLUTION INTRAVENOUS at 05:30

## 2018-01-01 RX ADMIN — ACETAMINOPHEN 650 MG: 325 TABLET, FILM COATED ORAL at 06:55

## 2018-01-01 RX ADMIN — ONDANSETRON HYDROCHLORIDE 4 MG: 2 INJECTION, SOLUTION INTRAMUSCULAR; INTRAVENOUS at 20:45

## 2018-01-01 RX ADMIN — SUCRALFATE 1 G: 1 SUSPENSION ORAL at 00:40

## 2018-01-01 RX ADMIN — SODIUM CHLORIDE, PRESERVATIVE FREE 3 ML: 5 INJECTION INTRAVENOUS at 09:22

## 2018-01-01 RX ADMIN — VANCOMYCIN HYDROCHLORIDE 125 MG: KIT at 11:39

## 2018-01-01 RX ADMIN — ENOXAPARIN SODIUM 40 MG: 40 INJECTION SUBCUTANEOUS at 17:56

## 2018-01-01 RX ADMIN — HYDROMORPHONE HYDROCHLORIDE 0.5 MG: 1 INJECTION, SOLUTION INTRAMUSCULAR; INTRAVENOUS; SUBCUTANEOUS at 20:44

## 2018-01-01 RX ADMIN — SODIUM CHLORIDE, PRESERVATIVE FREE 3 ML: 5 INJECTION INTRAVENOUS at 08:56

## 2018-01-01 RX ADMIN — FAMOTIDINE 20 MG: 10 INJECTION INTRAVENOUS at 09:20

## 2018-01-01 RX ADMIN — SODIUM CHLORIDE 1000 ML: 9 INJECTION, SOLUTION INTRAVENOUS at 12:12

## 2018-01-01 RX ADMIN — CYCLOSPORINE 1 DROP: 0.5 EMULSION OPHTHALMIC at 00:11

## 2018-01-01 RX ADMIN — ASPIRIN 81 MG: 81 TABLET ORAL at 09:36

## 2018-01-01 RX ADMIN — MONTELUKAST SODIUM 10 MG: 10 TABLET, FILM COATED ORAL at 10:58

## 2018-01-01 RX ADMIN — VANCOMYCIN HYDROCHLORIDE 125 MG: KIT at 18:15

## 2018-01-01 RX ADMIN — ENOXAPARIN SODIUM 40 MG: 40 INJECTION SUBCUTANEOUS at 18:04

## 2018-01-01 RX ADMIN — CYCLOSPORINE 1 DROP: 0.5 EMULSION OPHTHALMIC at 22:50

## 2018-01-01 RX ADMIN — SUCRALFATE 1 G: 1 SUSPENSION ORAL at 18:20

## 2018-01-01 RX ADMIN — ROCURONIUM BROMIDE 5 MG: 10 INJECTION INTRAVENOUS at 09:48

## 2018-01-01 RX ADMIN — SUCRALFATE 1 G: 1 SUSPENSION ORAL at 10:43

## 2018-01-01 RX ADMIN — SODIUM CHLORIDE, PRESERVATIVE FREE 3 ML: 5 INJECTION INTRAVENOUS at 22:52

## 2018-01-01 RX ADMIN — METRONIDAZOLE 500 MG: 500 INJECTION, SOLUTION INTRAVENOUS at 00:52

## 2018-01-01 RX ADMIN — CYCLOSPORINE 1 DROP: 0.5 EMULSION OPHTHALMIC at 20:12

## 2018-01-01 RX ADMIN — MIRTAZAPINE 15 MG: 15 TABLET, FILM COATED ORAL at 01:27

## 2018-01-01 RX ADMIN — MEROPENEM 500 MG: 500 INJECTION, POWDER, FOR SOLUTION INTRAVENOUS at 13:56

## 2018-01-01 RX ADMIN — SUCCINYLCHOLINE CHLORIDE 80 MG: 20 INJECTION, SOLUTION INTRAMUSCULAR; INTRAVENOUS at 21:03

## 2018-01-01 RX ADMIN — GLYCOPYRROLATE 0.4 MG: 0.2 INJECTION, SOLUTION INTRAMUSCULAR; INTRAVENOUS at 11:30

## 2018-01-01 RX ADMIN — ALBUTEROL SULFATE 1.25 MG: 1.25 SOLUTION RESPIRATORY (INHALATION) at 07:47

## 2018-01-01 RX ADMIN — Medication 500 MG: at 09:22

## 2018-01-01 RX ADMIN — LIDOCAINE HYDROCHLORIDE 80 MG: 20 INJECTION, SOLUTION INFILTRATION; PERINEURAL at 09:48

## 2018-01-01 RX ADMIN — ONDANSETRON HYDROCHLORIDE 4 MG: 2 INJECTION, SOLUTION INTRAMUSCULAR; INTRAVENOUS at 06:56

## 2018-01-01 RX ADMIN — IPRATROPIUM BROMIDE AND ALBUTEROL SULFATE 3 ML: 2.5; .5 SOLUTION RESPIRATORY (INHALATION) at 13:48

## 2018-01-01 RX ADMIN — ROCURONIUM BROMIDE 20 MG: 10 INJECTION INTRAVENOUS at 10:05

## 2018-01-01 RX ADMIN — NYSTATIN 500000 UNITS: 500000 SUSPENSION ORAL at 18:33

## 2018-01-01 RX ADMIN — SODIUM CHLORIDE 75 ML/HR: 9 INJECTION, SOLUTION INTRAVENOUS at 15:06

## 2018-01-01 RX ADMIN — CYCLOSPORINE 1 DROP: 0.5 EMULSION OPHTHALMIC at 09:20

## 2018-01-01 RX ADMIN — SODIUM CHLORIDE 50 ML/HR: 9 INJECTION, SOLUTION INTRAVENOUS at 18:12

## 2018-01-01 RX ADMIN — ISOSORBIDE MONONITRATE 60 MG: 60 TABLET, EXTENDED RELEASE ORAL at 08:24

## 2018-01-01 RX ADMIN — FENTANYL CITRATE 25 MCG: 50 INJECTION, SOLUTION INTRAMUSCULAR; INTRAVENOUS at 13:08

## 2018-01-01 RX ADMIN — VANCOMYCIN HYDROCHLORIDE 125 MG: KIT at 02:04

## 2018-01-01 RX ADMIN — SODIUM CHLORIDE 50 ML/HR: 9 INJECTION, SOLUTION INTRAVENOUS at 21:27

## 2018-01-01 RX ADMIN — Medication 500 MG: at 10:43

## 2018-01-01 RX ADMIN — ONDANSETRON 4 MG: 2 INJECTION, SOLUTION INTRAMUSCULAR; INTRAVENOUS at 12:51

## 2018-01-01 RX ADMIN — VANCOMYCIN HYDROCHLORIDE 125 MG: KIT at 06:57

## 2018-01-01 RX ADMIN — SODIUM CHLORIDE 100 ML/HR: 9 INJECTION, SOLUTION INTRAVENOUS at 06:32

## 2018-01-01 RX ADMIN — SODIUM CHLORIDE, POTASSIUM CHLORIDE, SODIUM LACTATE AND CALCIUM CHLORIDE: 600; 310; 30; 20 INJECTION, SOLUTION INTRAVENOUS at 09:57

## 2018-01-01 RX ADMIN — Medication 80 MCG: at 10:58

## 2018-01-01 RX ADMIN — LORAZEPAM 0.5 MG: 0.5 TABLET ORAL at 23:30

## 2018-01-01 RX ADMIN — ERTAPENEM SODIUM 1 G: 1 INJECTION, POWDER, LYOPHILIZED, FOR SOLUTION INTRAMUSCULAR; INTRAVENOUS at 17:05

## 2018-01-01 RX ADMIN — LEVOFLOXACIN 500 MG: 5 INJECTION, SOLUTION INTRAVENOUS at 15:35

## 2018-01-01 RX ADMIN — BISACODYL 10 MG: 10 SUPPOSITORY RECTAL at 18:16

## 2018-01-01 RX ADMIN — IPRATROPIUM BROMIDE AND ALBUTEROL SULFATE 3 ML: 2.5; .5 SOLUTION RESPIRATORY (INHALATION) at 22:44

## 2018-01-01 RX ADMIN — SODIUM CHLORIDE 1 G: 900 INJECTION INTRAVENOUS at 18:04

## 2018-01-01 RX ADMIN — Medication 500 MG: at 10:17

## 2018-01-01 RX ADMIN — CLINDAMYCIN PHOSPHATE 900 MG: 18 INJECTION, SOLUTION INTRAVENOUS at 09:54

## 2018-01-01 RX ADMIN — SUCRALFATE 1 G: 1 SUSPENSION ORAL at 11:44

## 2018-01-01 RX ADMIN — TAMSULOSIN HYDROCHLORIDE 0.4 MG: 0.4 CAPSULE ORAL at 10:43

## 2018-01-01 RX ADMIN — ENOXAPARIN SODIUM 30 MG: 30 INJECTION SUBCUTANEOUS at 19:01

## 2018-01-01 RX ADMIN — OXYCODONE HYDROCHLORIDE AND ACETAMINOPHEN 1 TABLET: 5; 325 TABLET ORAL at 18:52

## 2018-01-01 RX ADMIN — ROPINIROLE HYDROCHLORIDE 0.5 MG: 0.25 TABLET, FILM COATED ORAL at 21:37

## 2018-01-01 RX ADMIN — MONTELUKAST SODIUM 10 MG: 10 TABLET, FILM COATED ORAL at 09:36

## 2018-01-01 RX ADMIN — ENOXAPARIN SODIUM 30 MG: 30 INJECTION SUBCUTANEOUS at 11:44

## 2018-01-01 RX ADMIN — ONDANSETRON HYDROCHLORIDE 4 MG: 2 INJECTION, SOLUTION INTRAMUSCULAR; INTRAVENOUS at 04:40

## 2018-01-01 RX ADMIN — SODIUM CHLORIDE 75 ML/HR: 9 INJECTION, SOLUTION INTRAVENOUS at 18:18

## 2018-01-01 RX ADMIN — ACETAMINOPHEN 650 MG: 325 TABLET, FILM COATED ORAL at 22:49

## 2018-01-01 RX ADMIN — MEGESTROL ACETATE 400 MG: 40 SUSPENSION ORAL at 08:24

## 2018-01-01 RX ADMIN — DOCUSATE SODIUM 100 MG: 100 CAPSULE, LIQUID FILLED ORAL at 21:38

## 2018-01-01 RX ADMIN — MORPHINE SULFATE 2 MG: 2 INJECTION, SOLUTION INTRAMUSCULAR; INTRAVENOUS at 21:17

## 2018-01-01 RX ADMIN — SODIUM CHLORIDE, PRESERVATIVE FREE 3 ML: 5 INJECTION INTRAVENOUS at 10:44

## 2018-01-01 RX ADMIN — DOCUSATE SODIUM 100 MG: 100 CAPSULE, LIQUID FILLED ORAL at 08:52

## 2018-01-01 RX ADMIN — DESMOPRESSIN ACETATE 40 MG: 0.2 TABLET ORAL at 21:22

## 2018-01-01 RX ADMIN — NYSTATIN 500000 UNITS: 500000 SUSPENSION ORAL at 20:47

## 2018-01-01 RX ADMIN — ROPINIROLE HYDROCHLORIDE 0.5 MG: 0.25 TABLET, FILM COATED ORAL at 22:50

## 2018-01-01 RX ADMIN — APIXABAN 2.5 MG: 2.5 TABLET, FILM COATED ORAL at 22:52

## 2018-01-01 RX ADMIN — MONTELUKAST SODIUM 10 MG: 10 TABLET, FILM COATED ORAL at 08:49

## 2018-01-01 RX ADMIN — ALBUMIN (HUMAN): 2.5 SOLUTION INTRAVENOUS at 21:10

## 2018-01-01 RX ADMIN — MORPHINE SULFATE 6 MG: 10 INJECTION INTRAVENOUS at 22:37

## 2018-01-01 RX ADMIN — Medication 500 MG: at 21:22

## 2018-01-01 RX ADMIN — ESZOPICLONE 3 MG: 3 TABLET, FILM COATED ORAL at 22:52

## 2018-01-01 RX ADMIN — ROPINIROLE HYDROCHLORIDE 0.5 MG: 0.25 TABLET, FILM COATED ORAL at 21:22

## 2018-01-01 RX ADMIN — ALBUTEROL SULFATE 1.25 MG: 1.25 SOLUTION RESPIRATORY (INHALATION) at 20:35

## 2018-01-01 RX ADMIN — HYDROMORPHONE HYDROCHLORIDE 0.5 MG: 1 INJECTION, SOLUTION INTRAMUSCULAR; INTRAVENOUS; SUBCUTANEOUS at 08:50

## 2018-01-01 RX ADMIN — IPRATROPIUM BROMIDE AND ALBUTEROL SULFATE 3 ML: 2.5; .5 SOLUTION RESPIRATORY (INHALATION) at 14:32

## 2018-01-01 RX ADMIN — SODIUM CHLORIDE, POTASSIUM CHLORIDE, SODIUM LACTATE AND CALCIUM CHLORIDE 1000 ML: 600; 310; 30; 20 INJECTION, SOLUTION INTRAVENOUS at 12:37

## 2018-01-01 RX ADMIN — PANTOPRAZOLE SODIUM 40 MG: 40 INJECTION, POWDER, FOR SOLUTION INTRAVENOUS at 06:29

## 2018-01-01 RX ADMIN — SODIUM CHLORIDE, PRESERVATIVE FREE 3 ML: 5 INJECTION INTRAVENOUS at 17:35

## 2018-01-01 RX ADMIN — SUCRALFATE 1 G: 1 SUSPENSION ORAL at 11:39

## 2018-01-01 RX ADMIN — MONTELUKAST SODIUM 10 MG: 10 TABLET, FILM COATED ORAL at 08:24

## 2018-01-01 RX ADMIN — SODIUM CHLORIDE, POTASSIUM CHLORIDE, SODIUM LACTATE AND CALCIUM CHLORIDE 1000 ML: 600; 310; 30; 20 INJECTION, SOLUTION INTRAVENOUS at 11:40

## 2018-01-01 RX ADMIN — TRAMADOL HYDROCHLORIDE 50 MG: 50 TABLET, COATED ORAL at 21:21

## 2018-01-01 RX ADMIN — PROPOFOL 120 MG: 10 INJECTION, EMULSION INTRAVENOUS at 21:03

## 2018-01-01 RX ADMIN — NYSTATIN 500000 UNITS: 500000 SUSPENSION ORAL at 17:06

## 2018-01-01 RX ADMIN — PANTOPRAZOLE SODIUM 40 MG: 40 INJECTION, POWDER, FOR SOLUTION INTRAVENOUS at 08:38

## 2018-01-01 RX ADMIN — METRONIDAZOLE 500 MG: 500 INJECTION, SOLUTION INTRAVENOUS at 13:10

## 2018-01-01 RX ADMIN — ESZOPICLONE 3 MG: 3 TABLET, FILM COATED ORAL at 21:55

## 2018-01-01 RX ADMIN — ASPIRIN 81 MG: 81 TABLET ORAL at 09:48

## 2018-01-01 RX ADMIN — FENTANYL CITRATE 100 MCG: 50 INJECTION INTRAMUSCULAR; INTRAVENOUS at 10:28

## 2018-01-01 RX ADMIN — SODIUM CHLORIDE 100 ML/HR: 9 INJECTION, SOLUTION INTRAVENOUS at 00:45

## 2018-01-01 RX ADMIN — IPRATROPIUM BROMIDE AND ALBUTEROL SULFATE 3 ML: 2.5; .5 SOLUTION RESPIRATORY (INHALATION) at 07:31

## 2018-01-01 RX ADMIN — MORPHINE SULFATE 4 MG: 10 INJECTION INTRAVENOUS at 12:35

## 2018-01-01 RX ADMIN — FAMOTIDINE 20 MG: 10 INJECTION, SOLUTION INTRAVENOUS at 09:48

## 2018-01-01 RX ADMIN — SODIUM CHLORIDE 500 MG: 9 INJECTION, SOLUTION INTRAVENOUS at 12:31

## 2018-01-01 RX ADMIN — SODIUM CHLORIDE, POTASSIUM CHLORIDE, SODIUM LACTATE AND CALCIUM CHLORIDE: 600; 310; 30; 20 INJECTION, SOLUTION INTRAVENOUS at 21:03

## 2018-01-01 RX ADMIN — FAMOTIDINE 20 MG: 20 TABLET, FILM COATED ORAL at 10:18

## 2018-01-01 RX ADMIN — ONDANSETRON 4 MG: 4 TABLET, FILM COATED ORAL at 10:59

## 2018-01-01 RX ADMIN — IPRATROPIUM BROMIDE AND ALBUTEROL SULFATE 3 ML: 2.5; .5 SOLUTION RESPIRATORY (INHALATION) at 14:31

## 2018-01-01 RX ADMIN — APIXABAN 2.5 MG: 2.5 TABLET, FILM COATED ORAL at 21:45

## 2018-01-01 RX ADMIN — MONTELUKAST SODIUM 10 MG: 10 TABLET, FILM COATED ORAL at 09:48

## 2018-01-01 RX ADMIN — IPRATROPIUM BROMIDE AND ALBUTEROL SULFATE 3 ML: 2.5; .5 SOLUTION RESPIRATORY (INHALATION) at 22:58

## 2018-01-01 RX ADMIN — FENTANYL CITRATE 100 MCG: 50 INJECTION INTRAMUSCULAR; INTRAVENOUS at 09:48

## 2018-01-01 RX ADMIN — SODIUM CHLORIDE, POTASSIUM CHLORIDE, SODIUM LACTATE AND CALCIUM CHLORIDE: 600; 310; 30; 20 INJECTION, SOLUTION INTRAVENOUS at 20:59

## 2018-01-01 RX ADMIN — MORPHINE SULFATE 2 MG: 10 INJECTION INTRAVENOUS at 12:05

## 2018-01-01 RX ADMIN — ROCURONIUM BROMIDE 35 MG: 10 INJECTION INTRAVENOUS at 21:09

## 2018-01-01 RX ADMIN — Medication 500 MG: at 21:55

## 2018-01-01 RX ADMIN — IPRATROPIUM BROMIDE AND ALBUTEROL SULFATE 3 ML: 2.5; .5 SOLUTION RESPIRATORY (INHALATION) at 06:51

## 2018-01-01 RX ADMIN — IOPAMIDOL 100 ML: 612 INJECTION, SOLUTION INTRAVENOUS at 10:52

## 2018-01-01 RX ADMIN — IPRATROPIUM BROMIDE AND ALBUTEROL SULFATE 3 ML: 2.5; .5 SOLUTION RESPIRATORY (INHALATION) at 06:12

## 2018-01-01 RX ADMIN — ISOSORBIDE MONONITRATE 60 MG: 60 TABLET, EXTENDED RELEASE ORAL at 10:58

## 2018-01-01 RX ADMIN — I.V. FAT EMULSION 50 G: 20 EMULSION INTRAVENOUS at 20:46

## 2018-01-01 RX ADMIN — CYCLOSPORINE 1 DROP: 0.5 EMULSION OPHTHALMIC at 10:18

## 2018-01-01 RX ADMIN — LEVOFLOXACIN 500 MG: 5 INJECTION, SOLUTION INTRAVENOUS at 16:15

## 2018-01-01 RX ADMIN — DOCUSATE SODIUM 100 MG: 100 CAPSULE, LIQUID FILLED ORAL at 20:39

## 2018-01-01 RX ADMIN — MIRTAZAPINE 15 MG: 15 TABLET, FILM COATED ORAL at 21:37

## 2018-01-01 RX ADMIN — FENTANYL CITRATE 150 MCG: 50 INJECTION, SOLUTION INTRAMUSCULAR; INTRAVENOUS at 21:30

## 2018-01-01 RX ADMIN — HYDROMORPHONE HYDROCHLORIDE 0.5 MG: 1 INJECTION, SOLUTION INTRAMUSCULAR; INTRAVENOUS; SUBCUTANEOUS at 03:55

## 2018-01-01 RX ADMIN — SODIUM CHLORIDE, POTASSIUM CHLORIDE, SODIUM LACTATE AND CALCIUM CHLORIDE: 600; 310; 30; 20 INJECTION, SOLUTION INTRAVENOUS at 09:42

## 2018-01-01 RX ADMIN — IPRATROPIUM BROMIDE AND ALBUTEROL SULFATE 3 ML: 2.5; .5 SOLUTION RESPIRATORY (INHALATION) at 06:46

## 2018-01-01 RX ADMIN — ALBUTEROL SULFATE 1.25 MG: 1.25 SOLUTION RESPIRATORY (INHALATION) at 08:01

## 2018-01-01 RX ADMIN — SUCRALFATE 1 G: 1 SUSPENSION ORAL at 18:04

## 2018-01-01 RX ADMIN — TAMSULOSIN HYDROCHLORIDE 0.4 MG: 0.4 CAPSULE ORAL at 09:23

## 2018-01-01 RX ADMIN — SUCRALFATE 1 G: 1 SUSPENSION ORAL at 10:17

## 2018-01-01 RX ADMIN — ISOSORBIDE MONONITRATE 60 MG: 60 TABLET, EXTENDED RELEASE ORAL at 09:00

## 2018-01-01 RX ADMIN — APIXABAN 2.5 MG: 2.5 TABLET, FILM COATED ORAL at 10:59

## 2018-01-01 RX ADMIN — BISACODYL 10 MG: 10 SUPPOSITORY RECTAL at 16:43

## 2018-01-01 RX ADMIN — NYSTATIN 500000 UNITS: 500000 SUSPENSION ORAL at 11:57

## 2018-01-01 RX ADMIN — CLINDAMYCIN IN 5 PERCENT DEXTROSE 900 MG: 18 INJECTION, SOLUTION INTRAVENOUS at 06:33

## 2018-01-01 RX ADMIN — ENOXAPARIN SODIUM 30 MG: 30 INJECTION SUBCUTANEOUS at 11:56

## 2018-01-01 RX ADMIN — CLINDAMYCIN IN 5 PERCENT DEXTROSE 900 MG: 18 INJECTION, SOLUTION INTRAVENOUS at 23:47

## 2018-01-01 RX ADMIN — METRONIDAZOLE 500 MG: 500 INJECTION, SOLUTION INTRAVENOUS at 20:12

## 2018-01-01 RX ADMIN — OXYCODONE HYDROCHLORIDE AND ACETAMINOPHEN 1 TABLET: 5; 325 TABLET ORAL at 04:53

## 2018-01-01 RX ADMIN — APIXABAN 2.5 MG: 2.5 TABLET, FILM COATED ORAL at 08:24

## 2018-01-01 RX ADMIN — ROPINIROLE HYDROCHLORIDE 0.5 MG: 0.25 TABLET, FILM COATED ORAL at 20:12

## 2018-01-01 RX ADMIN — SODIUM CHLORIDE 1 G: 900 INJECTION INTRAVENOUS at 17:06

## 2018-01-01 RX ADMIN — MONTELUKAST SODIUM 10 MG: 10 TABLET, FILM COATED ORAL at 09:21

## 2018-01-01 RX ADMIN — BISACODYL 10 MG: 10 SUPPOSITORY RECTAL at 08:50

## 2018-01-01 RX ADMIN — ROCURONIUM BROMIDE 10 MG: 10 INJECTION INTRAVENOUS at 21:50

## 2018-01-01 RX ADMIN — DESMOPRESSIN ACETATE 40 MG: 0.2 TABLET ORAL at 21:55

## 2018-01-01 RX ADMIN — IPRATROPIUM BROMIDE AND ALBUTEROL SULFATE 3 ML: 2.5; .5 SOLUTION RESPIRATORY (INHALATION) at 06:21

## 2018-01-01 RX ADMIN — IPRATROPIUM BROMIDE AND ALBUTEROL SULFATE 3 ML: 2.5; .5 SOLUTION RESPIRATORY (INHALATION) at 22:54

## 2018-01-01 RX ADMIN — ONDANSETRON HYDROCHLORIDE 4 MG: 2 INJECTION INTRAMUSCULAR; INTRAVENOUS at 22:50

## 2018-01-01 RX ADMIN — SODIUM CHLORIDE 75 ML/HR: 9 INJECTION, SOLUTION INTRAVENOUS at 11:59

## 2018-01-01 RX ADMIN — SODIUM CHLORIDE, PRESERVATIVE FREE 3 ML: 5 INJECTION INTRAVENOUS at 20:12

## 2018-01-01 RX ADMIN — MORPHINE SULFATE 1 MG: 2 INJECTION, SOLUTION INTRAMUSCULAR; INTRAVENOUS at 17:47

## 2018-01-01 RX ADMIN — METRONIDAZOLE 500 MG: 500 INJECTION, SOLUTION INTRAVENOUS at 04:12

## 2018-01-01 RX ADMIN — ENOXAPARIN SODIUM 40 MG: 40 INJECTION SUBCUTANEOUS at 17:44

## 2018-01-01 RX ADMIN — ALUMINUM HYDROXIDE, MAGNESIUM HYDROXIDE, AND DIMETHICONE 20 ML: 400; 400; 40 SUSPENSION ORAL at 00:09

## 2018-01-01 RX ADMIN — ALBUTEROL SULFATE 1.25 MG: 1.25 SOLUTION RESPIRATORY (INHALATION) at 08:07

## 2018-01-01 RX ADMIN — Medication 3 MG: at 11:30

## 2018-01-01 RX ADMIN — MORPHINE SULFATE 4 MG: 10 INJECTION INTRAVENOUS at 00:27

## 2018-01-01 RX ADMIN — CYCLOSPORINE 1 DROP: 0.5 EMULSION OPHTHALMIC at 09:35

## 2018-01-01 RX ADMIN — SUCRALFATE 1 G: 1 SUSPENSION ORAL at 17:43

## 2018-01-01 RX ADMIN — METRONIDAZOLE 500 MG: 500 INJECTION, SOLUTION INTRAVENOUS at 18:20

## 2018-01-01 RX ADMIN — MILK OF MAGNESIA 10 ML: 2400 CONCENTRATE ORAL at 23:30

## 2018-01-01 RX ADMIN — MONTELUKAST SODIUM 10 MG: 10 TABLET, FILM COATED ORAL at 09:38

## 2018-01-01 RX ADMIN — ALBUTEROL SULFATE 1.25 MG: 1.25 SOLUTION RESPIRATORY (INHALATION) at 18:58

## 2018-01-01 RX ADMIN — MONTELUKAST SODIUM 10 MG: 10 TABLET, FILM COATED ORAL at 08:52

## 2018-01-01 RX ADMIN — MEGESTROL ACETATE 400 MG: 40 SUSPENSION ORAL at 08:52

## 2018-01-01 RX ADMIN — SODIUM CHLORIDE, PRESERVATIVE FREE 3 ML: 5 INJECTION INTRAVENOUS at 09:36

## 2018-01-01 RX ADMIN — CYCLOSPORINE 1 DROP: 0.5 EMULSION OPHTHALMIC at 20:47

## 2018-01-01 RX ADMIN — VANCOMYCIN HYDROCHLORIDE 125 MG: KIT at 14:08

## 2018-01-01 RX ADMIN — IPRATROPIUM BROMIDE AND ALBUTEROL SULFATE 3 ML: 2.5; .5 SOLUTION RESPIRATORY (INHALATION) at 08:12

## 2018-01-01 RX ADMIN — SODIUM CHLORIDE 75 ML/HR: 9 INJECTION, SOLUTION INTRAVENOUS at 04:01

## 2018-01-01 RX ADMIN — SODIUM CHLORIDE, POTASSIUM CHLORIDE, SODIUM LACTATE AND CALCIUM CHLORIDE: 600; 310; 30; 20 INJECTION, SOLUTION INTRAVENOUS at 11:50

## 2018-01-01 RX ADMIN — FENTANYL CITRATE 100 MCG: 50 INJECTION, SOLUTION INTRAMUSCULAR; INTRAVENOUS at 21:03

## 2018-01-01 RX ADMIN — NYSTATIN 500000 UNITS: 500000 SUSPENSION ORAL at 11:44

## 2018-01-01 RX ADMIN — CALCIUM GLUCONATE 1 G: 98 INJECTION, SOLUTION INTRAVENOUS at 15:06

## 2018-01-01 RX ADMIN — IOHEXOL 50 ML: 300 INJECTION, SOLUTION INTRAVENOUS at 08:58

## 2018-01-01 RX ADMIN — IPRATROPIUM BROMIDE AND ALBUTEROL SULFATE 3 ML: 2.5; .5 SOLUTION RESPIRATORY (INHALATION) at 15:15

## 2018-01-01 RX ADMIN — ALBUTEROL SULFATE 1.25 MG: 1.25 SOLUTION RESPIRATORY (INHALATION) at 02:48

## 2018-01-01 RX ADMIN — ALBUTEROL SULFATE 1.25 MG: 1.25 SOLUTION RESPIRATORY (INHALATION) at 08:21

## 2018-01-01 RX ADMIN — TRAMADOL HYDROCHLORIDE 50 MG: 50 TABLET, COATED ORAL at 11:50

## 2018-01-01 RX ADMIN — MIRTAZAPINE 15 MG: 15 TABLET, FILM COATED ORAL at 20:36

## 2018-01-01 RX ADMIN — MONTELUKAST SODIUM 10 MG: 10 TABLET, FILM COATED ORAL at 10:18

## 2018-01-01 RX ADMIN — VANCOMYCIN HYDROCHLORIDE 125 MG: KIT at 06:55

## 2018-01-01 RX ADMIN — NYSTATIN 500000 UNITS: 500000 SUSPENSION ORAL at 09:48

## 2018-01-01 RX ADMIN — FAMOTIDINE 20 MG: 20 TABLET, FILM COATED ORAL at 08:49

## 2018-01-01 RX ADMIN — SUCRALFATE 1 G: 1 SUSPENSION ORAL at 12:50

## 2018-01-01 RX ADMIN — ESZOPICLONE 3 MG: 3 TABLET, FILM COATED ORAL at 00:04

## 2018-01-01 RX ADMIN — Medication 500 MG: at 09:48

## 2018-01-01 RX ADMIN — LEVOFLOXACIN 250 MG: 5 INJECTION, SOLUTION INTRAVENOUS at 16:39

## 2018-01-01 RX ADMIN — CYCLOSPORINE 1 DROP: 0.5 EMULSION OPHTHALMIC at 08:49

## 2018-01-01 RX ADMIN — Medication 10 ML: at 06:37

## 2018-01-01 RX ADMIN — Medication 500 MG: at 20:12

## 2018-01-01 RX ADMIN — OXYCODONE HYDROCHLORIDE AND ACETAMINOPHEN 1 TABLET: 5; 325 TABLET ORAL at 20:36

## 2018-01-01 RX ADMIN — IPRATROPIUM BROMIDE AND ALBUTEROL SULFATE 3 ML: 2.5; .5 SOLUTION RESPIRATORY (INHALATION) at 15:21

## 2018-01-01 RX ADMIN — ALBUTEROL SULFATE 1.25 MG: 1.25 SOLUTION RESPIRATORY (INHALATION) at 21:05

## 2018-01-01 RX ADMIN — ENOXAPARIN SODIUM 40 MG: 40 INJECTION SUBCUTANEOUS at 17:29

## 2018-01-01 RX ADMIN — DOCUSATE SODIUM 100 MG: 100 CAPSULE, LIQUID FILLED ORAL at 20:36

## 2018-01-01 RX ADMIN — Medication 500 MG: at 08:49

## 2018-01-01 RX ADMIN — INSULIN LISPRO 2 UNITS: 100 INJECTION, SOLUTION INTRAVENOUS; SUBCUTANEOUS at 00:43

## 2018-01-01 RX ADMIN — MEROPENEM 500 MG: 500 INJECTION, POWDER, FOR SOLUTION INTRAVENOUS at 09:21

## 2018-01-01 RX ADMIN — SUCRALFATE 1 G: 1 SUSPENSION ORAL at 18:32

## 2018-01-01 RX ADMIN — ASPIRIN 81 MG: 81 TABLET ORAL at 10:43

## 2018-01-01 RX ADMIN — SODIUM CHLORIDE 75 ML/HR: 9 INJECTION, SOLUTION INTRAVENOUS at 19:51

## 2018-01-01 RX ADMIN — LIDOCAINE HYDROCHLORIDE 60 MG: 20 INJECTION, SOLUTION INFILTRATION; PERINEURAL at 21:03

## 2018-01-01 RX ADMIN — I.V. FAT EMULSION 50 G: 20 EMULSION INTRAVENOUS at 18:03

## 2018-01-01 RX ADMIN — IPRATROPIUM BROMIDE AND ALBUTEROL SULFATE 3 ML: 2.5; .5 SOLUTION RESPIRATORY (INHALATION) at 15:58

## 2018-01-01 RX ADMIN — ROPINIROLE HYDROCHLORIDE 0.5 MG: 0.25 TABLET, FILM COATED ORAL at 20:39

## 2018-01-01 RX ADMIN — DAKIN'S SOLUTION 0.125% (QUARTER STRENGTH): 0.12 SOLUTION at 00:38

## 2018-01-01 RX ADMIN — PANTOPRAZOLE SODIUM 40 MG: 40 INJECTION, POWDER, FOR SOLUTION INTRAVENOUS at 06:09

## 2018-01-01 RX ADMIN — MEROPENEM 1 G: 1 INJECTION, POWDER, FOR SOLUTION INTRAVENOUS at 20:09

## 2018-01-01 RX ADMIN — DOCUSATE SODIUM 100 MG: 100 CAPSULE, LIQUID FILLED ORAL at 10:58

## 2018-01-01 RX ADMIN — ASPIRIN 81 MG: 81 TABLET ORAL at 10:18

## 2018-01-01 RX ADMIN — SUCCINYLCHOLINE CHLORIDE 100 MG: 20 INJECTION, SOLUTION INTRAMUSCULAR; INTRAVENOUS at 09:48

## 2018-01-01 RX ADMIN — Medication 500 MG: at 22:50

## 2018-01-01 RX ADMIN — TRAMADOL HYDROCHLORIDE 50 MG: 50 TABLET, COATED ORAL at 21:44

## 2018-01-01 RX ADMIN — CYCLOSPORINE 1 DROP: 0.5 EMULSION OPHTHALMIC at 09:48

## 2018-01-01 RX ADMIN — Medication 500 MG: at 09:36

## 2018-01-01 RX ADMIN — PROPOFOL 60 MG: 10 INJECTION, EMULSION INTRAVENOUS at 09:48

## 2018-01-01 RX ADMIN — MEROPENEM 500 MG: 500 INJECTION, POWDER, FOR SOLUTION INTRAVENOUS at 12:04

## 2018-01-01 RX ADMIN — SODIUM CHLORIDE, PRESERVATIVE FREE 3 ML: 5 INJECTION INTRAVENOUS at 00:37

## 2018-01-01 RX ADMIN — BISACODYL 10 MG: 10 SUPPOSITORY RECTAL at 09:20

## 2018-01-01 RX ADMIN — APIXABAN 2.5 MG: 2.5 TABLET, FILM COATED ORAL at 20:39

## 2018-01-01 RX ADMIN — ALBUTEROL SULFATE 1.25 MG: 1.25 SOLUTION RESPIRATORY (INHALATION) at 19:07

## 2018-01-01 RX ADMIN — SUCRALFATE 1 G: 1 SUSPENSION ORAL at 06:11

## 2018-01-01 RX ADMIN — SUCRALFATE 1 G: 1 SUSPENSION ORAL at 08:49

## 2018-01-01 RX ADMIN — ROPINIROLE HYDROCHLORIDE 0.5 MG: 0.25 TABLET, FILM COATED ORAL at 20:36

## 2018-01-01 RX ADMIN — MEGESTROL ACETATE 400 MG: 40 SUSPENSION ORAL at 09:36

## 2018-01-01 RX ADMIN — SODIUM CHLORIDE 75 ML/HR: 9 INJECTION, SOLUTION INTRAVENOUS at 22:25

## 2018-01-01 RX ADMIN — SUCRALFATE 1 G: 1 SUSPENSION ORAL at 11:59

## 2018-01-01 RX ADMIN — MIRTAZAPINE 15 MG: 15 TABLET, FILM COATED ORAL at 21:45

## 2018-01-01 RX ADMIN — TAMSULOSIN HYDROCHLORIDE 0.4 MG: 0.4 CAPSULE ORAL at 18:06

## 2018-01-01 RX ADMIN — ONDANSETRON 4 MG: 2 INJECTION INTRAMUSCULAR; INTRAVENOUS at 12:56

## 2018-01-01 RX ADMIN — MEGESTROL ACETATE 800 MG: 40 SUSPENSION ORAL at 18:05

## 2018-01-01 RX ADMIN — SUCRALFATE 1 G: 1 SUSPENSION ORAL at 11:57

## 2018-01-01 RX ADMIN — SUCRALFATE 1 G: 1 SUSPENSION ORAL at 17:30

## 2018-01-01 RX ADMIN — SUCRALFATE 1 G: 1 SUSPENSION ORAL at 09:36

## 2018-01-01 RX ADMIN — VANCOMYCIN HYDROCHLORIDE 125 MG: KIT at 17:31

## 2018-01-01 RX ADMIN — SODIUM BICARBONATE 75 ML/HR: 84 INJECTION, SOLUTION INTRAVENOUS at 17:58

## 2018-01-01 RX ADMIN — SODIUM CHLORIDE 125 ML/HR: 9 INJECTION, SOLUTION INTRAVENOUS at 04:12

## 2018-01-01 RX ADMIN — MORPHINE SULFATE 4 MG: 10 INJECTION INTRAVENOUS at 05:58

## 2018-01-01 RX ADMIN — IPRATROPIUM BROMIDE AND ALBUTEROL SULFATE 3 ML: 2.5; .5 SOLUTION RESPIRATORY (INHALATION) at 01:18

## 2018-01-01 RX ADMIN — ROPINIROLE HYDROCHLORIDE 0.5 MG: 0.25 TABLET, FILM COATED ORAL at 21:55

## 2018-01-01 RX ADMIN — ISOSORBIDE MONONITRATE 60 MG: 60 TABLET, EXTENDED RELEASE ORAL at 09:36

## 2018-01-01 RX ADMIN — PANTOPRAZOLE SODIUM 40 MG: 40 INJECTION, POWDER, FOR SOLUTION INTRAVENOUS at 06:37

## 2018-01-01 RX ADMIN — VANCOMYCIN HYDROCHLORIDE 125 MG: KIT at 17:43

## 2018-09-01 PROBLEM — S72.90XA FEMUR FRACTURE (HCC): Status: ACTIVE | Noted: 2018-01-01

## 2018-09-01 NOTE — ANESTHESIA POSTPROCEDURE EVALUATION
"Patient: Negin Martinez    Procedure Summary     Date:  09/01/18 Room / Location:  [facility] OR  /  [facility] OR    Anesthesia Start:  0942 Anesthesia Stop:  1224    Procedure:  DISTAL FEMUR OPEN REDUCTION INTERNAL FIXATION (Left Thigh) Diagnosis:      Surgeon:  Jose Ignacio MD Provider:  Andrew Ybarra CRNA    Anesthesia Type:  general ASA Status:  3          Anesthesia Type: general  Last vitals  BP   103/43 (09/01/18 1400)   Temp   98 °F (36.7 °C) (09/01/18 1400)   Pulse   81 (09/01/18 1400)   Resp   14 (09/01/18 1400)     SpO2   96 % (09/01/18 1400)     Post Anesthesia Care and Evaluation    Patient location during evaluation: PACU  Patient participation: complete - patient participated  Level of consciousness: awake and alert  Pain management: adequate  Airway patency: patent  Anesthetic complications: No anesthetic complications  PONV Status: none  Cardiovascular status: acceptable  Respiratory status: acceptable  Hydration status: acceptable    Comments: Blood pressure 103/43, pulse 81, temperature 98 °F (36.7 °C), temperature source Oral, resp. rate 14, height 160 cm (63\"), weight 51.9 kg (114 lb 7 oz), SpO2 96 %.    Pt discharged from PACU based on almaz score >8      "

## 2018-09-01 NOTE — ANESTHESIA PREPROCEDURE EVALUATION
Anesthesia Evaluation     Patient summary reviewed   NPO Solid Status: > 8 hours  NPO Liquid Status: > 8 hours           Airway   Mallampati: II  TM distance: >3 FB  Neck ROM: full  Dental    (+) edentulous    Pulmonary    (+) COPD moderate, decreased breath sounds,     ROS comment: Home O2, hx pulmonary fibrosis  Cardiovascular - normal exam  Exercise tolerance: poor (<4 METS)    ECG reviewed    (+) hypertension, hyperlipidemia,     ROS comment: EKG 9/1: NSR, no prior ECGs available for comparison    Neuro/Psych  (+) CVA,     GI/Hepatic/Renal/Endo    (+)  GERD,      Musculoskeletal     Abdominal    Substance History      OB/GYN          Other                        Anesthesia Plan    ASA 3     general     intravenous induction   Anesthetic plan and risks discussed with patient.

## 2018-09-01 NOTE — ANESTHESIA PROCEDURE NOTES
Airway  Urgency: elective    Airway not difficult    General Information and Staff    Patient location during procedure: OR  CRNA: LEFTY MCKINNEY    Indications and Patient Condition  Indications for airway management: airway protection    Preoxygenated: yes  Mask difficulty assessment: 1 - vent by mask    Final Airway Details  Final airway type: endotracheal airway      Successful airway: ETT  Cuffed: yes   Successful intubation technique: direct laryngoscopy  Endotracheal tube insertion site: oral  Blade: Alex  Blade size: 2  ETT size: 7.5 mm  Cormack-Lehane Classification: grade IIa - partial view of glottis  Placement verified by: chest auscultation and capnometry   Cuff volume (mL): 8  Measured from: lips  ETT to lips (cm): 21  Number of attempts at approach: 1    Additional Comments  Atraumatic intubation

## 2018-09-17 NOTE — PROGRESS NOTES
Ms. Martinez is 88 y.o. female    CHIEF COMPLAINT: I am here for urinary retention. I have a catheter.     HPI  Patient for urinary retention.  This is presumed to be a lower urinary tract issue.  It occurred in the context of having undergone orthopedic surgery on her left knee.  She is currently in a skilled nursing facility for physical therapy and recovery.  Prior to his episode she has had issues with recurrent bacteriuria that is been treated with antibiotics as well as recently having an ESBL positive E coli infection.  Presently she denies any suprapubic discomfort or flank pain.  No fever.  She has felt better since the antibiotics but has still been unable to void with the severity being residuals over 800 mL.       The following portions of the patient's history were reviewed and updated as appropriate: allergies, current medications, past family history, past medical history, past social history, past surgical history and problem list.      Review of Systems   Constitutional: Negative for appetite change, diaphoresis and fever.   HENT: Negative for facial swelling and sore throat.    Eyes: Negative for discharge and visual disturbance.   Respiratory: Negative for cough and shortness of breath.    Cardiovascular: Negative for chest pain and leg swelling.   Gastrointestinal: Negative for anal bleeding and vomiting.   Endocrine: Negative for cold intolerance and heat intolerance.   Genitourinary: Positive for difficulty urinating (was unable to void. now has a catheter). Negative for flank pain, hematuria and pelvic pain.   Musculoskeletal: Negative for back pain and gait problem.   Skin: Negative for pallor and rash.   Allergic/Immunologic: Negative for food allergies.   Neurological: Negative for seizures and headaches.   Hematological: Negative for adenopathy. Does not bruise/bleed easily.   Psychiatric/Behavioral: Negative for dysphoric mood, self-injury and suicidal ideas.           Current Outpatient  Prescriptions:   •  acetaminophen (TYLENOL) 325 MG tablet, Take 650 mg by mouth Every 4 (Four) Hours As Needed for Mild Pain  or Fever., Disp: , Rfl:   •  albuterol (ACCUNEB) 1.25 MG/3ML nebulizer solution, Take 1 ampule by nebulization 2 (Two) Times a Day., Disp: , Rfl:   •  albuterol (ACCUNEB) 1.25 MG/3ML nebulizer solution, Take 1 ampule by nebulization At Night As Needed for Wheezing., Disp: , Rfl:   •  apixaban (ELIQUIS) 2.5 MG tablet tablet, Take 1 tablet by mouth Every 12 (Twelve) Hours for 34 days., Disp: 68 tablet, Rfl: 0  •  cetirizine (zyrTEC) 10 MG tablet, Take 10 mg by mouth Daily As Needed for Allergies., Disp: , Rfl:   •  cycloSPORINE (RESTASIS) 0.05 % ophthalmic emulsion, Administer 1 drop to both eyes Every 12 (Twelve) Hours., Disp: , Rfl:   •  docusate sodium 100 MG capsule, Take 100 mg by mouth 2 (Two) Times a Day., Disp: , Rfl:   •  eszopiclone (LUNESTA) 3 MG tablet, Take 3 mg by mouth Every Night. Take immediately before bedtime, Disp: , Rfl:   •  guaiFENesin (ROBITUSSIN) 100 MG/5ML syrup, Take 200 mg by mouth Every 4 (Four) Hours As Needed for Cough., Disp: , Rfl:   •  isosorbide mononitrate (IMDUR) 60 MG 24 hr tablet, Take 60 mg by mouth Daily., Disp: , Rfl:   •  magnesium hydroxide (MILK OF MAGNESIA) 2400 MG/10ML suspension suspension, Take 10 mL by mouth Daily As Needed (constipation)., Disp: 10 mL, Rfl:   •  megestrol (MEGACE) 40 MG/ML suspension, Take 10 mL by mouth Daily., Disp: , Rfl:   •  mirtazapine (REMERON) 15 MG tablet, Take 15 mg by mouth Every Night., Disp: , Rfl:   •  montelukast (SINGULAIR) 10 MG tablet, Take 10 mg by mouth Daily., Disp: , Rfl:   •  pantoprazole (PROTONIX) 40 MG EC tablet, Take 40 mg by mouth Daily., Disp: , Rfl:   •  polyethyl glycol-propyl glycol (SYSTANE) 0.4-0.3 % solution ophthalmic solution, Administer 2 drops to both eyes Every 4 (Four) Hours As Needed (dry eyes)., Disp: , Rfl:   •  rOPINIRole (REQUIP) 0.5 MG tablet, Take 0.5 mg by mouth Every Night. Take  "1 hour before bedtime., Disp: , Rfl:   •  traMADol (ULTRAM) 50 MG tablet, Take 1 tablet by mouth Every 6 (Six) Hours As Needed for Moderate Pain ., Disp: 12 tablet, Rfl: 0  •  umeclidinium-vilanterol (ANORO ELLIPTA) 62.5-25 MCG/INH aerosol powder  inhaler, Inhale 1 puff Daily., Disp: , Rfl:     Past Medical History:   Diagnosis Date   • COPD (chronic obstructive pulmonary disease) (CMS/HCC)    • GERD (gastroesophageal reflux disease)    • Hyperlipidemia    • Hypertension    • Restless leg    • Stroke (CMS/HCC)     residual left weakness        Past Surgical History:   Procedure Laterality Date   • APPENDECTOMY     • CHOLECYSTECTOMY     • FEMUR OPEN REDUCTION INTERNAL FIXATION Left 9/1/2018    Procedure: DISTAL FEMUR OPEN REDUCTION INTERNAL FIXATION;  Surgeon: Jose Ignacio MD;  Location: [facility];  Service: Orthopedics   • PARTIAL HIP ARTHROPLASTY Left    • REPLACEMENT TOTAL KNEE Left    • TONSILLECTOMY         Social History     Social History   • Marital status:      Social History Main Topics   • Smoking status: Former Smoker   • Smokeless tobacco: Never Used      Comment: quit in 1990   • Alcohol use No   • Drug use: No     Other Topics Concern   • Not on file       History reviewed. No pertinent family history.      Temp 97.2 °F (36.2 °C)   Ht 160 cm (63\")   Wt 49 kg (108 lb)   BMI 19.13 kg/m²       Physical Exam  Constitutional: Well nourished, Well developed; No apparent distress; Vital reviewed as above  Psychiatric: Appropriate affect; Alert and oriented  Eyes: Unremarkable  GI: Abdomen is soft, non-tender  Respiratory: No distress; Unlabored movement; No accessory musculature needed with symmetric movements  Skin: No pallor or diaphoresis  Lymphatic: No adenopathy neck or groin  Musculoskeletal: She is in a left knee brace.  She has generalized muscle atrophy.      Data  Results for orders placed or performed in visit on 09/19/18   Comprehensive Metabolic Panel   Result Value Ref Range    " Glucose 70 70 - 100 mg/dL    BUN 14 5 - 21 mg/dL    Creatinine 0.88 0.50 - 1.40 mg/dL    Sodium 140 135 - 145 mmol/L    Potassium 3.9 3.5 - 5.3 mmol/L    Chloride 107 98 - 110 mmol/L    CO2 25.0 24.0 - 31.0 mmol/L    Calcium 9.1 8.4 - 10.4 mg/dL    Total Protein 6.1 (L) 6.3 - 8.7 g/dL    Albumin 3.00 (L) 3.50 - 5.00 g/dL    ALT (SGPT) 26 0 - 54 U/L    AST (SGOT) 43 7 - 45 U/L    Alkaline Phosphatase 157 (H) 24 - 120 U/L    Total Bilirubin 0.9 0.1 - 1.0 mg/dL    eGFR Non African Amer 61 >60 mL/min/1.73    Globulin 3.1 gm/dL    A/G Ratio 1.0 (L) 1.1 - 2.5 g/dL    BUN/Creatinine Ratio 15.9 7.0 - 25.0    Anion Gap 8.0 4.0 - 13.0 mmol/L   CBC Auto Differential   Result Value Ref Range    WBC 9.88 4.80 - 10.80 10*3/mm3    RBC 3.56 (L) 4.20 - 5.40 10*6/mm3    Hemoglobin 10.6 (L) 12.0 - 16.0 g/dL    Hematocrit 32.6 (L) 37.0 - 47.0 %    MCV 91.6 82.0 - 98.0 fL    MCH 29.8 28.0 - 32.0 pg    MCHC 32.5 (L) 33.0 - 36.0 g/dL    RDW 13.4 12.0 - 15.0 %    RDW-SD 44.4 40.0 - 54.0 fl    MPV 11.3 6.0 - 12.0 fL    Platelets 504 (H) 130 - 400 10*3/mm3    Neutrophil % 69.5 39.0 - 78.0 %    Lymphocyte % 15.1 15.0 - 45.0 %    Monocyte % 9.5 4.0 - 12.0 %    Eosinophil % 4.6 (H) 0.0 - 4.0 %    Basophil % 0.8 0.0 - 2.0 %    Immature Grans % 0.5 0.0 - 5.0 %    Neutrophils, Absolute 6.87 1.87 - 8.40 10*3/mm3    Lymphocytes, Absolute 1.49 0.72 - 4.86 10*3/mm3    Monocytes, Absolute 0.94 0.19 - 1.30 10*3/mm3    Eosinophils, Absolute 0.45 0.00 - 0.70 10*3/mm3    Basophils, Absolute 0.08 0.00 - 0.20 10*3/mm3    Immature Grans, Absolute 0.05 (H) 0.00 - 0.03 10*3/mm3    nRBC 0.0 0.0 - 0.0 /100 WBC     Status:  Final result   Visible to patient:  No (Not Released) Dx:  Encounter for general adult medical e...   Specimen Information: Urine, Clean Catch; Urine        Urine Culture No growth at 2 days          Resulting Agency: [facility]      Specimen Collected: 09/15/18 15:22 Last Resulted: 09/17/18 06:35              09/07/2018 urine culture  ESBL  positive E coli.        Assessment and Plan  Diagnoses and all orders for this visit:    Urinary retention  -     Cancel: POC Urinalysis Dipstick, Multipro      -This is likely an acute realization of a chronic issue.  It is probably worsened since surgery.  From urologic standpoint, there is really nothing I can do for this except make recommendations on how to manage catheter.  -I would recommend that the Damian catheter be removed.  I would recommend that she be started on a timed, prompted voiding schedule of every 6 hours.  If she is unable to void or voids less than 150 mL, I would do a bladder scan.  If the bladder scan is greater than 500 mL, I would then do an in and out cath.  -Given recent ESBL positive Escherichia coli culture, I would recommend avoidance of using antibiotic therapy for bacteriuria or non-urologic symptoms with bacteriuria.  Frankly there is nothing I can do about this from a urologic standpoint either.  Consider infectious disease consult IF recurrent.      (Please note that portions of this note were completed with a voice recognition program.)  Magdiel Weeks MD  09/21/18  9:54 AM      Cc: Jayashree Stephenson APRN

## 2018-11-25 PROBLEM — N39.0 ACUTE UTI: Status: ACTIVE | Noted: 2018-01-01

## 2018-12-01 NOTE — CONSULTS
PULMONARY AND CRITICAL CARE CONSULT - [facility]    Negin Martinez   MR# [id number]  Acct# [id number]  12/1/2018   12:28 PM    Referring Provider: Calin Sam*    Chief Complaint: Mechanically ventilated    HPI: We are consulted by Calin Sam* to see this 88 y.o. female born on 1/10/1930.   She is known to have copd and uses Anoro Respimat chronically for mild intermittent shortness of breath in her chest for several years, stable associated with wheeze.  She had recent orthopedic surgery almost 3 months ago, and was recovering with rehab and developed nausea and diarrhea a week ago treated for a citrobacter uti and also c diff.  Further workup identified increasing wbc with peritoneal signs.  She underwent bowel resection and ostomy formation last evening.  She remains on the ventilator and has not been able to work with awakening and we have been asked to see her to assist with her care.    Past Medical History   has a past medical history of COPD (chronic obstructive pulmonary disease) (CMS/[facility]), GERD (gastroesophageal reflux disease), Hyperlipidemia, Hypertension, Restless leg, and Stroke (CMS/[facility]).   has a past surgical history that includes Replacement total knee (Left); Appendectomy; Tonsillectomy; Cholecystectomy; Partial hip arthroplasty (Left); and DISTAL FEMUR OPEN REDUCTION INTERNAL FIXATION (Left, 9/1/2018).  Allergies   Allergen Reactions   • Cephalexin Rash     Medications    albuterol 1 ampule Nebulization BID - RT   aspirin 81 mg Oral Daily   atorvastatin 40 mg Oral Nightly   cycloSPORINE 1 drop Both Eyes Q12H   enoxaparin 30 mg Subcutaneous Q12H   famotidine 20 mg Intravenous Q12H   Or      famotidine 20 mg Oral Q12H   fat emulsion 250 mL Intravenous Q24H (TPN)   insulin lispro 0-9 Units Subcutaneous 4x Daily With Meals & Nightly   lactated ringers 1,000 mL Intravenous Once   meropenem 500 mg Intravenous Q12H   meropenem 500 mg Intravenous Q12H    metroNIDAZOLE 500 mg Intravenous Q6H   montelukast 10 mg Oral Daily   nystatin 5 mL Swish & Swallow 4x Daily   rOPINIRole 0.5 mg Oral Nightly   saccharomyces boulardii 500 mg Oral BID   sodium chloride 3 mL Intravenous Q12H   sucralfate 1 g Oral Q6H   tamsulosin 0.4 mg Oral Daily       Adult Standard Central TPN     dextrose 5 % and sodium chloride 0.45 % 125 mL/hr Last Rate: 125 mL/hr (12/01/18 0923)   Pharmacy to Dose meropenem (MERREM)     sodium chloride 70 mL/hr Last Rate: 125 mL/hr (11/30/18 0412)     Social History   reports that she has quit smoking. she has never used smokeless tobacco. She reports that she does not drink alcohol or use drugs.  Family History  family history is not on file.  family history is not obtainable  Review of Systems:  Cannot obtain due to mechanical ventilation.  The patient notably is critically ill and connected to a ventilator.  As such patient cannot communicate and provide any history whatsoever, including any history of present illness or interval history since arrival or review of systems. The interested reviewer may note this fact, as an attempt has been made at collecting and documenting these portions of the patient history, but this information is unobtainable despite attempted review and therefore cannot be documented at this time.   Physical Exam:  Temp:  [96.6 °F (35.9 °C)-98.5 °F (36.9 °C)] 98.3 °F (36.8 °C)  Heart Rate:  [114-136] 117  Resp:  [12-29] 29  BP: ()/(23-62) 115/43  FiO2 (%):  [30 %-50 %] 30 %    Intake/Output Summary (Last 24 hours) at 12/1/2018 1228  Last data filed at 12/1/2018 0600  Gross per 24 hour   Intake 1700 ml   Output 170 ml   Net 1530 ml         11/29/18  2050 12/01/18  0600   Weight: 54.7 kg (120 lb 8 oz) 56.3 kg (124 lb 1.6 oz)     SpO2 Percentage    12/01/18 1005 12/01/18 1115 12/01/18 1205   SpO2: 100% 100% 100%     Physical Exam   Constitutional: She appears well-nourished. She appears lethargic.  Non-toxic appearance. She has a  sickly appearance. No distress. She is sedated and intubated.   HENT:   Head: Normocephalic.   Nose: Nose normal.   Eyes: EOM are normal. No scleral icterus.   Neck: Neck supple. No tracheal deviation present.   Cardiovascular: Exam reveals no friction rub.   Pulmonary/Chest: No accessory muscle usage. She is intubated. No respiratory distress. She has no wheezes. She has no rhonchi. She has rales. She exhibits no tenderness.   Brisk respiratory effort with downward deflection of pressure waveform.   Abdominal: Soft. She exhibits no distension. There is no tenderness. There is no guarding.   Fresh ileostomy in rlq.   Musculoskeletal: She exhibits no edema or deformity.   Generalized reduced muscle bulk   Neurological: She appears lethargic.   Skin: Skin is warm. She is not diaphoretic. No erythema.   Psychiatric: Her affect is not labile. She is not agitated.     Ventilator Settings:     Vt (Set, L): 0.5 L  Resp Rate (Set): 10  Pressure Support (cm H2O): 12 cm H20(per Dr Barnett)  FiO2 (%): 30 %  PEEP/CPAP (cm H2O): 5 cm H20  Minute Ventilation (L/min) (Obs): 13.2 L/min  Resp Rate (Observed) Vent: 26  I:E Ratio (Set): 1:2.00  I:E Ratio (Obs): 1:2.6  PIP Observed (cm H2O): 18 cm H2O  Plateau Pressure (cm H2O): 16 cm H2O     Results from last 7 days   Lab Units  12/01/18 0528 11/30/18 0523 11/29/18   0426   WBC 10*3/mm3  37.63*  43.68*  36.97*   HEMOGLOBIN g/dL  10.5*  12.8  13.4   PLATELETS 10*3/mm3  509*  505*  472*     Results from last 7 days   Lab Units  12/01/18   0528  12/01/18   0010  11/30/18   0523 11/29/18   0426   SODIUM mmol/L  141   --   139  138   SODIUM, ARTERIAL mmol/L   --   139   --    --    POTASSIUM mmol/L  4.4   --   4.4  4.5   CO2 mmol/L  15.0*   --   16.0*  20.0*   BUN mg/dL  46*   --   38*  29*   CREATININE mg/dL  2.47*   --   2.04*  1.47*   GLUCOSE mg/dL  65*   --   65*  84     Results from last 7 days   Lab Units  12/01/18   1200  12/01/18   0010  11/30/18 2015   PH, ARTERIAL pH units   7.375  7.168*  7.388   PCO2, ARTERIAL mm Hg  23.4*  43.0  25.9*   PO2 ART mm Hg  111.0*  199.0*  84.7   FIO2 %  30  50   --      Blood Culture   Date Value Ref Range Status   11/29/2018 No growth at 24 hours  Preliminary   11/25/2018 No growth at 5 days  Final   11/25/2018 No growth at 5 days  Final     Urine Culture   Date Value Ref Range Status   11/25/2018 >100,000 CFU/mL Citrobacter freundii (A)  Final     Wound Culture   Date Value Ref Range Status   11/30/2018 No growth at 24 hours  Preliminary     No results found for: PROBNP  Recent radiology:   Imaging Results (last 72 hours)     Procedure Component Value Units Date/Time    XR Chest 1 View [[id number]] Collected:  12/01/18 0801     Updated:  12/01/18 0805    Narrative:       XR CHEST 1 VW- 12/1/2018 12:48 AM CST     HISTORY: Postop chest x-ray; N39.0-Urinary tract infection, site not  specified; R41.82-Altered mental status, unspecified;  A04.72-Enterocolitis due to Clostridium difficile, not specified as  recurrent; R13.12-Dysphagia, oropharyngeal phase; R53.1-Weakness;  Z74.09-Other reduced mobility; Z74.09-Other reduced mobility;  K56.609-Unspecified intestinal obstruction, unspecified as to partial  versus complete obstruction     COMPARISON: 11/30/2018.     FINDINGS:   An endotracheal tube has been placed and is in appropriate position. The  enteric tube extends into the stomach. The cardiomediastinal silhouette  and pulmonary vascularity are unchanged. The lungs are essentially  clear. There is atherosclerosis in the aorta.     The osseous structures and surrounding soft tissues demonstrate no acute  abnormality.       Impression:       1. Satisfactory position of the endotracheal and enteric tubes.        This report was finalized on 12/01/2018 08:02 by Dr. Neeraj Hernandez MD.    XR Abdomen KUB [[id number]] Collected:  11/30/18 1226     Updated:  11/30/18 1230    Narrative:       XR ABDOMEN KUB- 11/30/2018 11:32 AM CST     HISTORY: Abnormal CAT scan.  Gaseous distention of the bowel. Ascites.       COMPARISON: CT 11/29/2018     FINDINGS:  Gaseous distention of the small bowel loops is again noted. A Damian  catheter is present. Atherosclerotic calcifications are seen.     Evaluation for free intraperitoneal air is limited on a supine  radiograph, but there are no definite findings of free intraperitoneal  air.      The osseous structures demonstrate no acute abnormality.        Impression:       1. Gaseous distention of the small bowel could be due to obstruction or  ileus.         This report was finalized on 11/30/2018 12:27 by Dr. Neeraj Hernandez MD.    XR Chest 1 View [[id number]] Collected:  11/30/18 1219     Updated:  11/30/18 1223    Narrative:       Exam:   XR CHEST 1 VW-       Date:  11/30/2018      History:  Female, age  88 years;fluid?; N39.0-Urinary tract infection,  site not specified; R41.82-Altered mental status, unspecified;  A04.72-Enterocolitis due to Clostridium difficile, not specified as  recurrent; R13.12-Dysphagia, oropharyngeal phase; R53.1-Weakness;  Z74.09-Other reduced mobility; Z74.09-Other reduced mobility     COMPARISON:  None.     Findings :     The heart and mediastinum are normal in size. Lungs are without focal  infiltrate, mass or effusions. Right paratracheal calcified granuloma  redemonstrated.  The bones show no acute pathology.         Impression:       Impression:     No acute cardiopulmonary disease.     This report was finalized on 11/30/2018 12:20 by Dr. Nuria Cornell MD.    CT Abdomen Pelvis Without Contrast [[id number]] Collected:  11/29/18 1414     Updated:  11/29/18 1437    Narrative:       CT ABDOMEN AND PELVIS without contrast 11/29/2018 12:28 PM CST     HISTORY: Abdominal pain     COMPARISON: CT scan dated 9/5/2018      DLP: 282 mGy cm     TECHNIQUE: Noncontrast enhanced images of the abdomen and pelvis  obtained without oral contrast.      FINDINGS:   Bilateral small layering pleural effusions. No lung  consolidation. There  is a small pericardial effusion.      LIVER: Grossly normal.      BILIARY SYSTEM: Color appears surgically absent. Biliary tree is  nondilated.      PANCREAS: 2.1 cm oval low-attenuation cystic lesion at the pancreatic  head neck junction is less well seen on this exam is compared with the  prior.      SPLEEN: Normal in size. There is a slightly heterogeneous appearance of  the no discrete lesion is seen.      KIDNEYS AND ADRENALS: Adrenal glands are unremarkable. No hydronephrosis  identified. The ureters are decompressed and normal in appearance.     RETROPERITONEUM: No mass, lymphadenopathy or hemorrhage.      GI TRACT: Fluid identified in a nondistended distal esophagus. The  stomach is nondistended. The small bowel is near completely  decompressed. Moderate stool identified in the ascending colon.  Air-fluid levels are identified in the ascending and proximal transverse  colon with the colon measuring up to 3.7 cm. The descending and sigmoid  colon are completely decompressed.     OTHER: Large volume intraperitoneal free fluid throughout the abdomen.  No gross intraperitoneal free air. No organized extraluminal collection  identified. Osseous structures and soft tissues demonstrate no worrisome  lesions. Lower thoracic and upper lumbar compression deformities are  chronic. Prominent bilateral flank edema.     PELVIS: No mass lesion, fluid collection or significant lymphadenopathy  is seen in the pelvis. The Damian catheter is in place. The Damian  catheter loops within the urinary bladder. The bladder is moderately  distended.       Impression:       1. Large volume intraperitoneal free fluid with bilateral small layering  pleural effusions and extensive flank edema.  2. Again seen is a rounded low-attenuation lesion at the pancreatic head  neck junction measuring up to 2.1 cm. This appears to be arising within  the pancreatic parenchyma. Pancreatic neoplasm is certainly  a  consideration.  3. The bowel is nondistended. Difficult to evaluate for bowel wall  thickening without IV contrast. There does not appear to be evidence for  viscous perforation or peritoneal abscess.  4. Damian catheter is looped within the urinary bladder.        This report was finalized on 2018 14:34 by Dr Peterson Morris, .        My radiograph interpretation/independent review of imaging: copd changes, ett in trachea  Other test results (not lab or imaging): pft [facility] fev1 73% pred.  Independent review of ek18 left atrial abnormality, nonspecific stt abn.    Problem List as identified by Epic (may contain historical, inactive problems)  Patient Active Problem List   Diagnosis   • Femur fracture (CMS/[facility])   • Acute UTI     Pulmonary Assessment:  SEVERE ACUTE RESPIRATORY FAILURE REQUIRING MECHANICAL VENTILATION  This is a threat to life or pulmonary function, high risk, due to anesthesia and metabolic acidosis related to ischemic colitis.    New problem (to me), with additional workup planned: acute respiratory failure  New problem (to me), no additional workup planned:  Moderate (grade B) copd, already diagnosed on chronic therapy and without exacerbation currently  Other problems either stable, failing to improve or worsenin. Recent hip fracture  2. Acute metabolic acidosis related to ischemic bowel and cdiff and uti  3. uti under tx  4. Advanced age  5. Severe leukocytosis, anemia, related to presentation    Recommend/plan:   Ventilator order set, including intravenous narcotics, benzodiazepines (that is controlled drugs) for sedation and ventilator tolerance  Chest X-ray in the morning tomorrow  Arterial blood gas analysis in the morning tomorrow  Additional plan:  · Vent adjusted to increase flow and peep to counterbalance autopeep and reduce wob  · Follow up cbc  · Follow up abg with vent settings  · Daily spontaneous breathing trial starting tomorrow    Electronically signed by  Chan Barnett MD on 12/1/2018 at 12:28 PM

## 2018-12-01 NOTE — PROGRESS NOTES
Pharmacy Dosing Service  Antimicrobials  Merrem    Assessment/Action/Plan:  Merrem dosed at 1g IV once over 30 minutes followed by 500mg IV q12 via extended infusion per protocol based on indication and renal function. Pharmacy will continue to follow daily and adjust as needed.     Subjective:  Negin Martinez is a 88 y.o. female currently being treated for sepsis.    Objective:  Estimated Creatinine Clearance: 16.5 mL/min (A) (by C-G formula based on SCr of 2.04 mg/dL (H)).   Lab Results   Component Value Date    CREATININE 2.04 (H) 11/30/2018    CREATININE 1.47 (H) 11/29/2018    CREATININE 0.90 11/28/2018     Lab Results   Component Value Date    WBC 43.68 (C) 11/30/2018     Temp Readings from Last 1 Encounters:   11/30/18 96.6 °F (35.9 °C) (Temporal)       Culture Results:  Microbiology Results (last 10 days)       Procedure Component Value - Date/Time    Blood Culture With ERWIN - Blood, Arm, Right [[id number]] Collected:  11/29/18 1612    Lab Status:  Preliminary result Specimen:  Blood from Arm, Right Updated:  11/30/18 1631     Blood Culture No growth at 24 hours    Gastrointestinal Panel, PCR - Stool, Per Rectum [[id number]]  (Abnormal) Collected:  11/25/18 1318    Lab Status:  Final result Specimen:  Stool from Per Rectum Updated:  11/25/18 1446     Campylobacter Not Detected     Clostridium difficile (toxin A/B) Detected     Comment:   Due to the high asymptomatic carriage rates, especially in young children, the clinical relevance of the detection of toxigenic C. difficile from stool should be considered in the context of other clinical findings, patient age, and risk factors including hospitalization and antibiotic exposure.        Plesiomonas shigelloides Not Detected     Salmonella Not Detected     Vibrio Not Detected     Vibrio cholerae Not Detected     Yersinia enterocolitica Not Detected     Enteroaggregative E. coli (EAEC) Not Detected     Enteropathogenic E. coli (EPEC) Not Detected      Enterotoxigenic E. coli (ETEC) lt/st Not Detected     Shiga-like toxin-producing E. coli (STEC) stx1/stx2 Not Detected     E. coli O157 Not Detected     Shigella/Enteroinvasive E. coli (EIEC) Not Detected     Cryptosporidium Not Detected     Cyclospora cayetanensis Not Detected     Entamoeba histolytica Not Detected     Giardia lamblia Not Detected     Adenovirus F40/41 Not Detected     Astrovirus Not Detected     Norovirus GI/GII Not Detected     Rotavirus A Not Detected     Sapovirus (I, II, IV or V) Not Detected    Urine Culture - Urine, Urine, Catheter [[id number]]  (Abnormal)  (Susceptibility) Collected:  11/25/18 1213    Lab Status:  Final result Specimen:  Urine, Catheter Updated:  11/27/18 0734     Urine Culture >100,000 CFU/mL Citrobacter freundii    Susceptibility        Citrobacter freundii     JULIANNE     Cefazolin Resistant     Cefepime Susceptible     Ceftriaxone Susceptible     Ertapenem Susceptible     Gentamicin Susceptible     Levofloxacin Susceptible     Meropenem Susceptible     Nitrofurantoin Resistant                      Blood Culture - Blood, Wrist, Left [[id number]] Collected:  11/25/18 1205    Lab Status:  Final result Specimen:  Blood from Wrist, Left Updated:  11/30/18 1245     Blood Culture No growth at 5 days    Blood Culture - Blood, Arm, Right [[id number]] Collected:  11/25/18 1205    Lab Status:  Final result Specimen:  Blood from Arm, Right Updated:  11/30/18 1245     Blood Culture No growth at 5 days          Current Antimicrobials:   Anti-Infectives (From admission, onward)      Ordered     Dose/Rate Route Frequency Start Stop    11/30/18 1844  meropenem (MERREM) 500mg/100 mL 0.9% NS IVPB (mbp)     Ordering Provider:  Brenda Finney MD    500 mg  over 3 Hours Intravenous Every 12 Hours 12/01/18 0100 12/08/18 0059    11/30/18 1844  meropenem (MERREM) 1 g/100 mL 0.9% NS VTB (mbp)     Ordering Provider:  Brenda Finney MD    1 g  over 30 Minutes Intravenous Once 11/30/18  1900      11/30/18 1830  Pharmacy to Dose meropenem (MERREM)     Ordering Provider:  Brenda Finney MD     Does not apply Continuous PRN 11/30/18 1829 12/07/18 1828    11/29/18 1214  metroNIDAZOLE (FLAGYL) IVPB 500 mg     Ordering Provider:  Calin Sam MD    500 mg  over 60 Minutes Intravenous Every 8 Hours 11/29/18 1300 12/09/18 1259    11/25/18 1508  levoFLOXacin (LEVAQUIN) 500 mg/100 mL D5W (premix) (LEVAQUIN) 500 mg     Ordering Provider:  Mauro Magallanes Jr., MD    500 mg Intravenous Once 11/25/18 1510 11/25/18 1638            Thanks for the consult,   ERICH sOcar, Pharm.D.    11/30/18 6:47 PM

## 2018-12-01 NOTE — PROGRESS NOTES
Infectious Diseases Progress Note    Patient:  Negin Martinez  YOB: 1930  MRN: [id number]   Admit date: 11/25/2018   Admitting Physician: Calin Sam*  Primary Care Physician: Praveen Tellez MD    Chief Complaint/Interval History: Had exploratory surgery yesterday.  Underwent right hemicolectomy for ischemic bowel.  Ostomy placed.  She is without fever.  She is on no medications to support blood pressure present.  She is mildly tachycardic.  She has appeared comfortable.  Daughter and granddaughter at bedside.  Urine output is been fairly minimal.  Oxygen requirements stable.    Intake/Output Summary (Last 24 hours) at 12/1/2018 1443  Last data filed at 12/1/2018 0600  Gross per 24 hour   Intake 1700 ml   Output 170 ml   Net 1530 ml     Allergies:   Allergies   Allergen Reactions   • Cephalexin Rash     Current Scheduled Medications:     albuterol 1 ampule Nebulization BID - RT   aspirin 81 mg Oral Daily   atorvastatin 40 mg Oral Nightly   cycloSPORINE 1 drop Both Eyes Q12H   [START ON 12/2/2018] enoxaparin 30 mg Subcutaneous Q24H   [START ON 12/2/2018] famotidine 20 mg Intravenous Daily   fat emulsion 250 mL Intravenous Q24H (TPN)   insulin lispro 0-9 Units Subcutaneous 4x Daily With Meals & Nightly   lidocaine PF 1% 1 mL Injection Once   meropenem 500 mg Intravenous Q12H   montelukast 10 mg Oral Daily   nystatin 5 mL Swish & Swallow 4x Daily   rOPINIRole 0.5 mg Oral Nightly   saccharomyces boulardii 500 mg Oral BID   sodium chloride 3 mL Intravenous Q12H   sucralfate 1 g Oral Q6H   tamsulosin 0.4 mg Oral Daily     Current PRN Medications:  •  acetaminophen  •  albuterol  •  albuterol  •  eszopiclone  •  HYDROcodone-acetaminophen  •  ipratropium-albuterol  •  LORazepam  •  Morphine  •  ondansetron  •  ondansetron  •  ondansetron ODT  •  Pharmacy to Dose meropenem (MERREM)  •  simethicone  •  [COMPLETED] Insert peripheral IV **AND** sodium chloride  •  sodium chloride    Review of Systems  "unobtainable from patient    Vital Signs:  /43   Pulse 117   Temp 98.3 °F (36.8 °C) (Tympanic)   Resp (!) 29   Ht 154.9 cm (61\")   Wt 56.3 kg (124 lb 1.6 oz)   SpO2 100%   BMI 23.45 kg/m²     Physical Exam  Vital signs reviewed.  General appears comfortable at present  Ostomy site pain can viable  Lungs with rare scattered crackle  Line/IV site: No erythema, warmth, induration, or tenderness.    Lab Results:  CBC: Results from last 7 days   Lab Units  12/01/18   0528 11/30/18   0523 11/29/18   0426  11/28/18   0705  11/27/18   0633  11/26/18   0433  11/25/18   1209   WBC 10*3/mm3  37.63*  43.68*  36.97*  25.57*  18.92*  15.32*  18.48*   HEMOGLOBIN g/dL  10.5*  12.8  13.4  12.8  12.6  12.1  13.6   HEMATOCRIT %  32.0*  38.8  42.1  39.0  39.5  37.1  41.4   PLATELETS 10*3/mm3  509*  505*  472*  460*  380  326  345     BMP:  Results from last 7 days   Lab Units  12/01/18   1238  12/01/18 0528  12/01/18   0010  11/30/18   0523 11/29/18   0426  11/28/18   0705  11/27/18   0654  11/26/18   0433  11/25/18   1209   SODIUM mmol/L  138  141   --   139  138  138  138  135  132*   SODIUM, ARTERIAL mmol/L   --    --   139   --    --    --    --    --    --    POTASSIUM mmol/L  4.4  4.4   --   4.4  4.5  4.4  4.3  4.1  4.1   CHLORIDE mmol/L  109  110   --   110  105  107  106  103  96*   CO2 mmol/L  12.0*  15.0*   --   16.0*  20.0*  22.0*  24.0  25.0  28.0   BUN mg/dL  47*  46*   --   38*  29*  21  21  29*  38*   CREATININE mg/dL  2.50*  2.47*   --   2.04*  1.47*  0.90  0.85  0.92  1.01   GLUCOSE mg/dL  112*  65*   --   65*  84  100  83  78  108*   CALCIUM mg/dL  7.3*  7.1*   --   8.1*  8.5  8.5  8.4  8.3*  9.0   ALT (SGPT) U/L  36  36   --   23   --    --    --    --   34     Culture Results:   Blood Culture   Date Value Ref Range Status   11/29/2018 No growth at 24 hours  Preliminary   11/25/2018 No growth at 5 days  Final   11/25/2018 No growth at 5 days  Final     Urine Culture   Date Value Ref Range Status "   11/25/2018 >100,000 CFU/mL Citrobacter freundii (A)  Final     Wound Culture   Date Value Ref Range Status   11/30/2018 No growth at 24 hours  Preliminary     Radiology: None  Additional Studies Reviewed: None    Impression:   1.  Postop day #1 following right hemicolectomy with and ileostomy for ischemic bowel  2.  Leukocytosis  3.  C. difficile positive  4.  Acute renal injury  5.  Positive urine culture for Citrobacter    Recommendations:   Family aware that with her age and comorbidities there is a good chance she may not recover from this hospitalization  Feel current antibiotic treatment with Merrem appropriate  Continue supportive care  Will continue to follow    Brice Blake MD

## 2018-12-01 NOTE — CONSULTS
Patient Care Team:  Praveen Tellez MD as PCP - General (Pulmonary Disease)    Chief complaint abdominal pain and no bowel movement for the past week   Subjective     Subjective .     History of present illness: Patient is an elderly 88-year-old female who was admitted on November 25 with reported diarrhea and nausea.  She had some progressive weakness she has a history of having long-term COPD and also a stroke she had a hip fracture on September 7 and underwent surgery by Dr. Ignacio.  She went to a rehabilitation facility and then a long-term care center and she has had progressive weakness also had some diarrhea by report she had positive C. difficile she denied any fever or chills she has had frequent urinary tract infections and by report her PCP checked her urine one week prior and it was clear.  Her granddaughter is in attendance this patient is  her  is in a long-term facility she has 2 daughters that care for her as well as her granddaughter she has been up moving and active until the hip fracture in early September.  In the past there was concern about the pancreatic nodule approximately 10 years prior this was cleared at [facility] and most recently she has had progressive abdominal pain no bowel activity, and progressive elevation of her white count from the initial admission 15,000 to currently it is 45,000 with 97% neutrophils.!  Her lactate is 2 her amylase was 2001 week prior and has not been rechecked her BUN and creatinine is also elevated with a BUN of 38 and a creatinine of 2.04.  She had normal renal function on admission.  She has recently had a CT scan 1 day prior that had a large volume of intraperitoneal free fluid and bilateral layering pleural effusions with extensive left flank edema.  She has a lesion in the pancreatic head that we discussed earlier at 2.1 her bowels nondistended I do not see any definite edema no mass but I do see this significant ascites present also  significant abdominal pain.  With this significant abdominal pain and a white count of 45,000 with lymphocytosis with 97% lymphocytes, she has been treated for C. difficile with Flagyl she has a very tender abdomen.  At this point I do not suggest waiting any further urinary option is whether or not to complete surgery at her desire.  I discussed that and 88 with a albumin of 2.1 is not very likely that she will heal any anastomosis but if there is a segment of bowel that needs to be resected and a colostomy brought up this of course would be in her favor to get over this I do not think she will do well with ileostomy if that is the end point.  The other option is that this is significant dead bowel throughout would not suggest an extensive resection.  Currently she is a chemical code only her family was advised of surgery they desire to proceed with surgical intervention planned for exploratory laparotomy lyses of adhesions treatment of the above problem.  If however there is extensive necrotic bowel RA on resolvable course I would suggest closing up and making her comfortable they are aware of this and give their informed consent    Past surgical history right paramedian incision for a crit cholecystectomy and appendectomy, hip arthroplasty on the left in September by Dr. Ignacio, total knee replacement on the left, tonsillectomy.    Medical positive for COPD, reflux, hyperlipidemia, hypertension, restless leg and stroke.    Allergies to cephalexin.  Causing a rash.    Currently she is on Merrem and also Flagyl she is being followed by infectious disease.    Medications are those listed..  .  Review of Systems  Pertinent items are noted in HPI, all other systems reviewed and negative    History  Past Medical History:   Diagnosis Date   • COPD (chronic obstructive pulmonary disease) (CMS/HCC)    • GERD (gastroesophageal reflux disease)    • Hyperlipidemia    • Hypertension    • Restless leg    • Stroke (CMS/HCC)      residual left weakness    , Past Surgical History:   Procedure Laterality Date   • APPENDECTOMY     • CHOLECYSTECTOMY     • PARTIAL HIP ARTHROPLASTY Left    • REPLACEMENT TOTAL KNEE Left    • TONSILLECTOMY     , History reviewed. No pertinent family history., Social History     Tobacco Use   • Smoking status: Former Smoker   • Smokeless tobacco: Never Used   • Tobacco comment: quit in 1990   Substance Use Topics   • Alcohol use: No   • Drug use: No   , Medications Prior to Admission   Medication Sig Dispense Refill Last Dose   • acetaminophen (TYLENOL) 325 MG tablet Take 650 mg by mouth Every 4 (Four) Hours As Needed for Mild Pain  or Fever.   Past Week at Unknown time   • albuterol (ACCUNEB) 1.25 MG/3ML nebulizer solution Take 1 ampule by nebulization 2 (Two) Times a Day.   8/31/2018 at 0900   • albuterol (ACCUNEB) 1.25 MG/3ML nebulizer solution Take 1 ampule by nebulization At Night As Needed for Wheezing.   Past Week at Unknown time   • albuterol (PROVENTIL HFA;VENTOLIN HFA) 108 (90 Base) MCG/ACT inhaler Inhale 2 puffs Every 4 (Four) Hours As Needed for Wheezing.      • aluminum-magnesium hydroxide-simethicone (MAALOX/MYLANTA) 200-200-20 MG/5ML suspension Take 30 mL by mouth Every 6 (Six) Hours As Needed for Indigestion or Heartburn.      • cetirizine (zyrTEC) 10 MG tablet Take 10 mg by mouth Daily As Needed for Allergies.   Past Week at Unknown time   • cycloSPORINE (RESTASIS) 0.05 % ophthalmic emulsion Administer 1 drop to both eyes Every 12 (Twelve) Hours.   8/31/2018 at 0900   • docusate sodium 100 MG capsule Take 100 mg by mouth 2 (Two) Times a Day. (Patient taking differently: Take 100 mg by mouth As Needed.)      • eszopiclone (LUNESTA) 3 MG tablet Take 3 mg by mouth Every Night. Take immediately before bedtime   8/30/2018 at 2100   • guaiFENesin (ROBITUSSIN) 100 MG/5ML syrup Take 200 mg by mouth Every 4 (Four) Hours As Needed for Cough.   Past Week at Unknown time   • magnesium hydroxide (MILK OF  MAGNESIA) 2400 MG/10ML suspension suspension Take 10 mL by mouth Daily As Needed (constipation). 10 mL     • montelukast (SINGULAIR) 10 MG tablet Take 10 mg by mouth Daily.      • pantoprazole (PROTONIX) 40 MG EC tablet Take 40 mg by mouth Daily.   8/31/2018 at 0900   • polyethyl glycol-propyl glycol (SYSTANE) 0.4-0.3 % solution ophthalmic solution Administer 2 drops to both eyes Every 4 (Four) Hours As Needed (dry eyes).   Past Week at Unknown time   • sucralfate (CARAFATE) 1 GM/10ML suspension Take 1 g by mouth 3 (Three) Times a Day With Meals.      • traMADol (ULTRAM) 50 MG tablet Take 1 tablet by mouth Every 6 (Six) Hours As Needed for Moderate Pain . 12 tablet 0    • umeclidinium-vilanterol (ANORO ELLIPTA) 62.5-25 MCG/INH aerosol powder  inhaler Inhale 1 puff Daily.   8/31/2018 at 0900   • rOPINIRole (REQUIP) 0.5 MG tablet Take 0.5 mg by mouth Every Night. Take 1 hour before bedtime.   8/30/2018 at 2100   , Scheduled Meds:    albuterol 1 ampule Nebulization BID - RT   aspirin 81 mg Oral Daily   atorvastatin 40 mg Oral Nightly   bisacodyl 10 mg Rectal Once   cycloSPORINE 1 drop Both Eyes Q12H   enoxaparin 30 mg Subcutaneous Q24H   famotidine 20 mg Oral Daily   megestrol 800 mg Oral Daily   meropenem 1 g Intravenous Once   [START ON 12/1/2018] meropenem 500 mg Intravenous Q12H   metroNIDAZOLE 500 mg Intravenous Q8H   montelukast 10 mg Oral Daily   nystatin 5 mL Swish & Swallow 4x Daily   rOPINIRole 0.5 mg Oral Nightly   saccharomyces boulardii 500 mg Oral BID   sodium chloride 3 mL Intravenous Q12H   sucralfate 1 g Oral TID With Meals   tamsulosin 0.4 mg Oral Daily   , Continuous Infusions:    Pharmacy to Dose meropenem (MERREM)     sodium chloride 70 mL/hr Last Rate: 125 mL/hr (11/30/18 0412)   , PRN Meds:  •  acetaminophen  •  albuterol  •  eszopiclone  •  guaiFENesin  •  ipratropium-albuterol  •  magnesium hydroxide  •  ondansetron  •  Pharmacy to Dose meropenem (MERREM)  •  [COMPLETED] Insert peripheral IV  **AND** sodium chloride  •  sodium chloride  •  traMADol and Allergies:  Cephalexin    Objective      Objective     Vital Signs   Temp:  [96.6 °F (35.9 °C)-97.6 °F (36.4 °C)] 96.6 °F (35.9 °C)  Heart Rate:  [] 114  Resp:  [18-20] 18  BP: (136-153)/(57-78) 149/62    Intake & Output (last 3 days)       11/28 0701 - 11/29 0700 11/29 0701 - 11/30 0700 11/30 0701 - 12/01 0700    P.O. 120      I.V. (mL/kg)       IV Piggyback  100     Total Intake(mL/kg) 120 (2.2) 100 (1.8)     Urine (mL/kg/hr) 475 (0.4) 200 (0.2)     Total Output 475 200     Net -355 -100                   Physical Exam:     General Appearance:  She is awake she is cooperative she complains of significant pain in the abdomen she hurts all over but especially in her abdomen    Head:    Normocephalic, without obvious abnormality, atraumatic   Eyes:            Lids and lashes normal, conjunctivae and sclerae normal, no   icterus, no pallor, corneas clear, PERRLA   Ears:    Ears appear intact with no abnormalities noted   Throat:   No oral lesions, no thrush, oral mucosa moist   Neck:   No adenopathy, supple, trachea midline, no thyromegaly, no   carotid bruit, no JVD   Back:     No kyphosis present, no scoliosis present, no skin lesions,      erythema or scars, no tenderness to percussion or                   palpation,   range of motion normal   Lungs:   Shallow and clear    Heart:    Regular rhythm and normal rate, normal S1 and S2, no            murmur, no gallop, no rub, no click   Chest Wall:    No abnormalities observed   Flat abdomen, no bowel sounds, tenderness in all quadrants, well-healed midline/paramedian incision on the right.        She is in a knee immobilizer from    Pulses:  recent hip surgery,  Pulses palpable and equal bilaterally   Skin:   No bleeding, bruising or rash   Lymph nodes:   No palpable adenopathy   Neurologic:   Cranial nerves 2 - 12 grossly intact, sensation intact, DTR       present and equal bilaterally        Results  from last 7 days   Lab Units  11/30/18   0523  11/29/18   0426  11/28/18   0705   WBC 10*3/mm3  43.68*  36.97*  25.57*   HEMOGLOBIN g/dL  12.8  13.4  12.8   HEMATOCRIT %  38.8  42.1  39.0   PLATELETS 10*3/mm3  505*  472*  460*        Results from last 7 days   Lab Units  11/30/18   0523  11/29/18   0426  11/28/18   0705   11/25/18   1209   SODIUM mmol/L  139  138  138   < >  132*   POTASSIUM mmol/L  4.4  4.5  4.4   < >  4.1   CHLORIDE mmol/L  110  105  107   < >  96*   CO2 mmol/L  16.0*  20.0*  22.0*   < >  28.0   BUN mg/dL  38*  29*  21   < >  38*   CREATININE mg/dL  2.04*  1.47*  0.90   < >  1.01   CALCIUM mg/dL  8.1*  8.5  8.5   < >  9.0   BILIRUBIN mg/dL  0.6   --    --    --   0.9   ALK PHOS U/L  257*   --    --    --   261*   ALT (SGPT) U/L  23   --    --    --   34   AST (SGOT) U/L  41   --    --    --   60*   GLUCOSE mg/dL  65*  84  100   < >  108*    < > = values in this interval not displayed.      Findings :  IMPRESSION:  1. No acute intracranial findings.    2. Findings of old right insular infarct, moderate to severe chronic  microvascular changes, moderate volume loss and vascular calcifications.  This report was finalized on 11/25/2018 15:16 by Dr Vilma Edwards MD.   Imaging          The heart and mediastinum are normal in size. Lungs are without focal  infiltrate, mass or effusions. Right paratracheal calcified granuloma  redemonstrated.  The bones show no acute pathology.       IMPRESSION:  Impression:     No acute cardiopulmonary disease.     This report was finalized on 11/30/2018 12:20 by Dr. Nuria Cornell MD.  IMPRESSION:  1. Gaseous distention of the small bowel could be due to obstruction or  ileus. IMPRESSION:  1. Large volume intraperitoneal free fluid with bilateral small layering  pleural effusions and extensive flank edema.  2. Again seen is a rounded low-attenuation lesion at the pancreatic head  neck junction measuring up to 2.1 cm. This appears to be arising within  the pancreatic  parenchyma. Pancreatic neoplasm is certainly a  consideration.  3. The bowel is nondistended. Difficult to evaluate for bowel wall  thickening without IV contrast. There does not appear to be evidence for  viscous perforation or peritoneal abscess.  4. Damian catheter is looped within the urinary bladder.  Results Review:   I reviewed the patient's new clinical results.    Assessment/Plan     Assessment/Plan       Acute UTI     patient is a 88-year-old female with a history of orthopedic injury 2 months ago and now with progressive declining function over the past 6 weeks.  She has been hospitalized here for the past week with progressive elevation of her white count from 15,000-46,000 with 97% neutrophils.  Patient with diffuse peritoneal signs, no bowel sounds, very troubling for her examination.  I do not see any obvious abnormalities on her CT scan other than there is some anasarca mainly on the right, she does have a large amount of ascites not noted on his September CT scan.  With the above problem she has a very significant abdominal process I believe and I think exploration and treatment is the best treatment.  If she has a very significant process i.e. the entire bowel in question would not suggest extensive resection if there is something resected and an ostomy to be brought up a length this would be a good viable option in this patient that was fairly functional until September.  She appears to understand as well as her granddaughter who is in attendance and they give their informed consent for surgery.      I discussed the patient's findings and my recommendations with patient, family and nursing staff    Varun Lynch MD  11/30/18  8:23 PM    Time: Time spent with patient 45 minutes     EMR Dragon/Transcription disclaimer: Much of this encounter note is an electronic transcription/translation of spoken language to printed text. The electronic translation of spoken language may permit erroneous, or at  times, nonsensical words or phrases to be inadvertently transcribed; although I have reviewed the note for such errors, some may still exist.

## 2018-12-01 NOTE — OP NOTE
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 with necrosis and perforation of the cecum requiring right colon resection and a end ileostomy    Procedure/CPT® Codes:      Procedure(s):  LAPAROTOMY EXPLORATORY RIGHT COLECTOMY and creation of a end ileostomy    Surgeon(s):  Varun Lynch MD    Anesthesia: General    Staff:   Circulator: Jo García RN  Scrub Person: Lisa Ross; Tawny Law  Assistant: Jacquie Lockett; Addie Engle    Estimated Blood Loss: 200 mL    Specimens:                ID Type Source Tests Collected by Time   1 : peritoneal fluid Wound Abdominal Wall ANAEROBIC CULTURE, WOUND CULTURE Varun Lynch MD 11/30/2018 2134   A :  Tissue Large Intestine, Right / Ascending Colon TISSUE PATHOLOGY EXAM Varun Lynch MD 11/30/2018 2301         Drains:   Urethral Catheter 16 Fr. (Active)   Daily Indications Acute retention 11/30/2018  8:20 PM   Site Assessment Clean;Skin intact 11/30/2018  8:20 PM   Collection Container Standard drainage bag 11/30/2018  8:20 PM   Securement Method Securing device 11/30/2018  8:20 PM   Catheter care complete Yes 11/30/2018  8:00 AM       Indications: Mrs. Shalini Martinez is an elderly 88-year-old female who was admitted on November 25 with reported diarrhea and nausea.  She had some progressive weakness she has a history of having long-term COPD and also a stroke she had a hip fracture on September 7 and underwent surgery by Dr. Ignacio.  She went to a rehabilitation facility and then a long-term care center and she has had progressive weakness also had some diarrhea by report she had positive C. difficile she denied any fever or chills she has had frequent urinary tract infections and by report her PCP checked her urine one week prior and it  was clear.  Her granddaughter is in attendance this patient is  her  is in a long-term facility she has 2 daughters that care for her as well as her granddaughter she has been up moving and active until the hip fracture in early September.  In the past there was concern about the pancreatic nodule approximately 10 years prior this was cleared at [facility] and most recently she has had progressive abdominal pain no bowel activity, and progressive elevation of her white count from the initial admission 15,000 to currently it is 45,000 with 97% neutrophils.!  Her lactate is 2 her amylase was 2001 week prior and has not been rechecked her BUN and creatinine is also elevated with a BUN of 38 and a creatinine of 2.04.  She had normal renal function on admission.  She has recently had a CT scan 1 day prior that had a large volume of intraperitoneal free fluid and bilateral layering pleural effusions with extensive left flank edema.  She has a lesion in the pancreatic head that we discussed earlier at 2.1 her bowels nondistended I do not see any definite edema no mass but I do see this significant ascites present also significant abdominal pain.  With this significant abdominal pain and a white count of 45,000 with lymphocytosis with 97% lymphocytes, she has been treated for C. difficile with Flagyl she has a very tender abdomen.  At this point I do not suggest waiting any further urinary option is whether or not to complete surgery at her desire.  I discussed that and 88 with a albumin of 2.1 is not very likely that she will heal any anastomosis but if there is a segment of bowel that needs to be resected and a colostomy brought up this of course would be in her favor to get over this I do not think she will do well with ileostomy if that is the end point.  The other option is that this is significant dead bowel throughout would not suggest an extensive resection.  Currently she is a chemical code only her  family was advised of surgery they desire to proceed with surgical intervention planned for exploratory laparotomy lyses of adhesions treatment of the above problem.  If however there is extensive necrotic bowel RA on resolvable course I would suggest closing up and making her comfortable they are aware of this and give their informed consent          Findings: Exploratory laparotomy, lysis of adhesions, findings of marginal small bowel, ischemia noted, with full-thickness necrosis at the cecum and chronic obstruction at the descending colon from adhesive disease from her previous cholecystectomy.  Lysis of adhesions completed 2 years of ascitic fluid aspirated and cultured, resection of the right colon and a end ileostomy created.      Complications: There were no complications     Procedure: Patient was placed in supine position the surgical suite and after adequate prepping and draping of been completed as 500 of Flagyl, 1 g am of merrum  given, and a timeout completed   the area was scrubbed prepped and draped with alcohol and Betadine and Ioban applied.  A midline skin incision was completed incising through the skin and subcutaneous taste tissue from the midepigastrium to just below the umbilicus.  Entry into the abdominal cavity was completed there was 2 L of ascitic fluid remove this was cultured.  There were some adhesions in the right upper quadrant and these were taken down the bowel was somewhat dusky with the adhesions taken down a wound protector was placed and notation of significant marginal small bowel was noted.  There was no particular adhesions in the small bowel there was no obvious dead small bowel but that was patchy and had decreased flow to this.  At this point the cecum seemed to be more of a problem it was not necrotic but it was significantly reduced viability.  We concentrated mainly our  attention to this the right side had some adhesions taken down and the descending colon and limited  its outflow and I suspect was the reason for the patient's state.  A Bookwalter retractor was placed and mobilization of the cecum and right colon was accomplished.  We used the Bovie electrocautery as well as cut and clamped and ligation with 2-0 silk as the patient received Lovenox within 2 hours of the operation.  With mobilization of the cecum and right colon and hepatic flexure the bowel seemed to perk up somewhat but the cecum never did and the ascending colon.  At this point I thought probably the best treatment would be to remove the right colon irrigated the abdomen hopefully keep as much small bowel as possible.  But in this patient I do not plan a anastomosis given her 2 L of ascites also a albumin of 2 and a white count of 45,000 and in the hospital for the past 3 months.  At this point we resected the right colon and removed this from the field there was an area of probable perforation in the ascending colon.  With this completed using the IVÁN stapling device the distal ileum and also mid transverse colon was resected and the mesentery to the subcutaneous bowel was resected and ligated with 2-0 silk suture.  With the bowel removed abdominal cavity was irrigated with 2 L of warm fluid and also 1 L of antibiotic irrigation with clear effluent noted a portion of small bowel was lengthened to enable a ostomy creation and with the field dry and correct sponge lap and needle count completed a opening was completed in the mid rectus below the umbilicus for the ostomy placement.  This disc was removed and dissection down to the rectus was completed a cruciate incision was completed and 2 finger dilatation was completed through the rectus and the and ileum was brought through this for creation of a ostomy.  With this completed we changed a clean closing set and irrigated the cavity once again with antibiotic irrigation 1 piece of Seprafilm was placed and the fascia was enclosed using interrupted figure-of-eight  sutures of #1 Prolene with the fascia closed in 3 areas of this skin was closed using interrupted vertical mattress suture of 3-0 nylon and between these 2 areas it was packed with iodoform 4 x 4.  Having completed this 4 x 4 and ABDs was applied and then attention was then turned toward the ostomy.  The ostomy was matured using simple inverted interrupted sutures of 3-0 Vicryl a total of 8 sutures were placed with excellent nipple construction of this end ileostomy.  Having completed this a ostomy bag was applied the patient was then weaned we will see if we can extubate her in the recovery room or not.  She will be admitted to the intensive care unit for follow-up and evaluation.      Date: 11/30/2018  Time: 11:44 PM    EMR Dragon/Transcription disclaimer: Much of this encounter note is an electronic transcription/translation of spoken language to printed text. The electronic translation of spoken language may permit erroneous, or at times, nonsensical words or phrases to be inadvertently transcribed; although I have reviewed the note for such errors, some may still exist.

## 2018-12-01 NOTE — PROGRESS NOTES
"PROGRESS NOTE.      Patient:  Negin Martinez  YOB: 1930  Date of Service: 12/1/2018  MRN: [id number]   Acct: [id number]   Primary Care Physician: Praveen Tellez MD  Advance Directive:   Code Status and Medical Interventions:   Ordered at: 11/25/18 5262     Limited Support to NOT Include:    Antiarrhythmic Drugs    Cardioversion/Defibrillation    Intubation     Level Of Support Discussed With:    Patient    Health Care Surrogate    Next of Kin (If No Surrogate)     Code Status:    No CPR     Medical Interventions (Level of Support Prior to Arrest):    Limited     Admit Date: 11/25/2018       Hospital Day: 6  Referring Provider: Isrrael Zavala MD    Subjective:    Negin Martinez is a 88 y.o. female  whom we were consulted for YEE. Patient was recently having diarrhea prior to admission. She was discharged from rehab where she was recovering from left femur fracture repair. Also treated for UTI. She was progressively getting weaker with decreased appetite. Her abdomen is sore. Her workup showed large volume intraperitoneal free fluid with bilateral small layering  pleural effusions and extensive flank edema. There was a concern about a small lesion in the pancreas head. Her creatinine has risen from 0.9 (11/28) to 1.47 then to 2.04 (11/30). On 11/30, she continues to have abdominal pain. She underwent exploratory laparotomy with surgery, had some bowel resection and a colostomy. Her amylase and lipase were also elevated. She is currently in CCU, still intubated, vented. She was oliguric. She was getting fluids.       Review of Systems:  UTO    Objective:  /43   Pulse 117   Temp 98.3 °F (36.8 °C) (Tympanic)   Resp (!) 29   Ht 154.9 cm (61\")   Wt 56.3 kg (124 lb 1.6 oz)   SpO2 100%   BMI 23.45 kg/m²     Intake/Output Summary (Last 24 hours) at 12/1/2018 1419  Last data filed at 12/1/2018 0600  Gross per 24 hour   Intake 1700 ml   Output 170 ml   Net 1530 ml       Physical " examination:  General: intubated, vented  Chest: Bilateral air entry with decreased breath sounds at the bases  CVS:+/- regular rate and rhythm  Abdominal: colostomy +, soft,no guarding  Extremities: no cyanosis or edema  Skin: warm and dry without rash  Neuro: UTO    Labs:  Lab Results (last 24 hours)     Procedure Component Value Units Date/Time    POC Glucose Once [[id number]]  (Normal) Collected:  12/01/18 1237    Specimen:  Blood Updated:  12/01/18 1259     Glucose 80 mg/dL      Comment: : [id number] Mark PennyMeter ID: [id number]       Cholesterol, Total [[id number]]  (Abnormal) Collected:  12/01/18 0528    Specimen:  Blood Updated:  12/01/18 1256     Total Cholesterol <50 mg/dL     C-reactive Protein [[id number]]  (Abnormal) Collected:  12/01/18 0528    Specimen:  Blood Updated:  12/01/18 1256     C-Reactive Protein 15.78 mg/dL     Comprehensive Metabolic Panel [[id number]]  (Abnormal) Collected:  12/01/18 1238    Specimen:  Blood Updated:  12/01/18 1254     Glucose 112 mg/dL      BUN 47 mg/dL      Creatinine 2.50 mg/dL      Sodium 138 mmol/L      Potassium 4.4 mmol/L      Chloride 109 mmol/L      CO2 12.0 mmol/L      Calcium 7.3 mg/dL      Total Protein 4.4 g/dL      Albumin 2.50 g/dL      ALT (SGPT) 36 U/L      AST (SGOT) 93 U/L      Alkaline Phosphatase 133 U/L      Total Bilirubin 0.8 mg/dL      eGFR Non African Amer 18 mL/min/1.73      Globulin 1.9 gm/dL      A/G Ratio 1.3 g/dL      BUN/Creatinine Ratio 18.8     Anion Gap 17.0 mmol/L     Narrative:       The MDRD GFR formula is only valid for adults with stable renal function between ages 18 and 70.    Prealbumin [[id number]]  (Abnormal) Collected:  11/30/18 0523    Specimen:  Blood Updated:  12/01/18 1249     Prealbumin 5.0 mg/dL     Magnesium [[id number]]  (Normal) Collected:  12/01/18 0528    Specimen:  Blood Updated:  12/01/18 1245     Magnesium 1.8 mg/dL     Phosphorus [[id number]]  (Normal) Collected:  12/01/18 0528    Specimen:  Blood Updated:   12/01/18 1245     Phosphorus 4.5 mg/dL     Triglycerides [[id number]]  (Normal) Collected:  12/01/18 0528    Specimen:  Blood Updated:  12/01/18 1245     Triglycerides 59 mg/dL     Wound Culture - Wound, Abdominal Wall [[id number]] Collected:  11/30/18 2134    Specimen:  Wound from Abdominal Wall Updated:  12/01/18 1223     Wound Culture No growth at 24 hours     Gram Stain Rare (1+) WBCs seen      No organisms seen    Calcium, Ionized [[id number]]  (Abnormal) Collected:  12/01/18 1200    Specimen:  Blood Updated:  12/01/18 1212     Ionized Calcium 4.20 mg/dL      Comment: 84 Value below reference range        Collected by [id number]     Comment: Meter: [id number]     :  [id number]       Blood Gas, Arterial [[id number]]  (Abnormal) Collected:  12/01/18 1200    Specimen:  Arterial Blood Updated:  12/01/18 1206     Site Right Radial     Obi's Test Positive     pH, Arterial 7.375 pH units      pCO2, Arterial 23.4 mm Hg      Comment: 84 Value below reference range        pO2, Arterial 111.0 mm Hg      Comment: 83 Value above reference range        HCO3, Arterial 13.7 mmol/L      Comment: 84 Value below reference range        Base Excess, Arterial -10.1 mmol/L      Comment: 84 Value below reference range        O2 Saturation, Arterial 98.8 %      Temperature 37.0 C      Barometric Pressure for Blood Gas 736 mmHg      Modality Ventilator     FIO2 30 %      Ventilator Mode SIMV     Set Tidal Volume 500     Set Mech Resp Rate 10.0     PEEP 5.0     PSV 10.0 cmH2O      Collected by [id number]     Comment: Meter: [id number]     :  [id number]       Anaerobic Culture - Wound, Abdominal Wall [[id number]] Collected:  11/30/18 2134    Specimen:  Wound from Abdominal Wall Updated:  12/01/18 1111     Culture No anaerobes isolated at 24 hours    Lipase [[id number]]  (Abnormal) Collected:  12/01/18 0528    Specimen:  Blood Updated:  12/01/18 0602     Lipase 2,981 U/L     CBC & Differential [[id number]] Collected:  12/01/18  0528    Specimen:  Blood Updated:  12/01/18 0555    Narrative:       The following orders were created for panel order CBC & Differential.  Procedure                               Abnormality         Status                     ---------                               -----------         ------                     Manual Differential[[id number]]                                                         CBC Auto Differential[[id number]]        Abnormal            Final result                 Please view results for these tests on the individual orders.    CBC Auto Differential [[id number]]  (Abnormal) Collected:  12/01/18 0528    Specimen:  Blood Updated:  12/01/18 0555     WBC 37.63 10*3/mm3      RBC 3.69 10*6/mm3      Hemoglobin 10.5 g/dL      Hematocrit 32.0 %      MCV 86.7 fL      MCH 28.5 pg      MCHC 32.8 g/dL      RDW 15.6 %      RDW-SD 49.6 fl      MPV 10.8 fL      Platelets 509 10*3/mm3     Amylase [[id number]]  (Abnormal) Collected:  12/01/18 0528    Specimen:  Blood Updated:  12/01/18 0553     Amylase 1,782 U/L     Uric Acid [[id number]]  (Normal) Collected:  12/01/18 0528    Specimen:  Blood Updated:  12/01/18 0547     Uric Acid 6.8 mg/dL     Comprehensive Metabolic Panel [[id number]]  (Abnormal) Collected:  12/01/18 0528    Specimen:  Blood Updated:  12/01/18 0547     Glucose 65 mg/dL      BUN 46 mg/dL      Creatinine 2.47 mg/dL      Sodium 141 mmol/L      Potassium 4.4 mmol/L      Chloride 110 mmol/L      CO2 15.0 mmol/L      Calcium 7.1 mg/dL      Total Protein 4.5 g/dL      Albumin 2.40 g/dL      ALT (SGPT) 36 U/L      AST (SGOT) 63 U/L      Alkaline Phosphatase 167 U/L      Total Bilirubin 0.7 mg/dL      eGFR Non African Amer 18 mL/min/1.73      Globulin 2.1 gm/dL      A/G Ratio 1.1 g/dL      BUN/Creatinine Ratio 18.6     Anion Gap 16.0 mmol/L     Narrative:       The MDRD GFR formula is only valid for adults with stable renal function between ages 18 and 70.    Protime-INR [[id number]]  (Abnormal) Collected:   12/01/18 0528    Specimen:  Blood Updated:  12/01/18 0545     Protime 19.7 Seconds      INR 1.61    Sedimentation Rate [[id number]]  (Normal) Collected:  12/01/18 0528    Specimen:  Blood Updated:  12/01/18 0544     Sed Rate <1 mm/hr     Sodium, Urine, Random - Urine, Clean Catch [[id number]]  (Normal) Collected:  12/01/18 0141    Specimen:  Urine, Clean Catch Updated:  12/01/18 0151     Sodium, Urine 30 mmol/L     Creatinine, Urine, Random - Urine, Clean Catch [[id number]] Collected:  12/01/18 0141    Specimen:  Urine, Clean Catch Updated:  12/01/18 0151     Creatinine, Urine 58.0 mg/dL     Urinalysis, Microscopic Only - Urine, Clean Catch [[id number]]  (Abnormal) Collected:  12/01/18 0123    Specimen:  Urine, Clean Catch Updated:  12/01/18 0146     RBC, UA 31-50 /HPF      WBC, UA Too Numerous to Count /HPF      Bacteria, UA Trace /HPF      Squamous Epithelial Cells, UA 0-2 /HPF      Renal Epithelial Cells, UA 0-2 /HPF      Hyaline Casts, UA 13-20 /LPF      Methodology Manual Light Microscopy    Urinalysis With Microscopic If Indicated (No Culture) - Urine, Clean Catch [[id number]]  (Abnormal) Collected:  12/01/18 0123    Specimen:  Urine, Clean Catch Updated:  12/01/18 0146     Color, UA Yellow     Appearance, UA Cloudy     pH, UA <=5.0     Specific Gravity, UA 1.016     Glucose, UA Negative     Ketones, UA Trace     Bilirubin, UA Negative     Blood, UA Large (3+)     Protein,  mg/dL (2+)     Leuk Esterase, UA Large (3+)     Nitrite, UA Negative     Urobilinogen, UA 0.2 E.U./dL    Blood Gas, Arterial With Co-Ox [[id number]]  (Abnormal) Collected:  12/01/18 0010    Specimen:  Arterial Blood Updated:  12/01/18 0016     Site Right Radial     Obi's Test Positive     pH, Arterial 7.168 pH units      Comment: 85 Value below critical limit        pCO2, Arterial 43.0 mm Hg      pO2, Arterial 199.0 mm Hg      Comment: 83 Value above reference range        HCO3, Arterial 15.6 mmol/L      Comment: 84 Value below reference  range        Base Excess, Arterial -12.2 mmol/L      Comment: 84 Value below reference range        O2 Saturation, Arterial 100.0 %      Comment: 83 Value above reference range        Hemoglobin, Blood Gas 9.0 g/dL      Comment: 84 Value below reference range        Hematocrit, Blood Gas 27.5 %      Comment: 84 Value below reference range        Oxyhemoglobin 98.5 %      Methemoglobin 0.60 %      Carboxyhemoglobin 1.0 %      A-a Gradiant -- mmHg      Comment: UNABLE TO CALCULATE        Temperature 37.0 C      Sodium, Arterial 139 mmol/L      Potassium, Arterial 4.2 mmol/L      Ionized Calcium 4.32 mg/dL      Comment: 84 Value below reference range        Barometric Pressure for Blood Gas 744 mmHg      Modality Ventilator     FIO2 50 %      Ventilator Mode SIMV     Set Tidal Volume 500     Set Mech Resp Rate 10.0     PEEP 5.0     PSV 10.0 cmH2O      Note --     Notified Who [id number]     Notified By [id number]     Notified Time 12/01/2018 00:16     Collected by [id number]     Comment: Meter: [id number]     :  [id number]        pH, Temp Corrected -- pH Units      pCO2, Temperature Corrected -- mm Hg      pO2, Temperature Corrected -- mm Hg     Blood Gas, Arterial [[id number]]  (Abnormal) Collected:  11/30/18 2015    Specimen:  Arterial Blood Updated:  11/30/18 2025     Site Right Radial     Obi's Test Positive     pH, Arterial 7.388 pH units      pCO2, Arterial 25.9 mm Hg      Comment: 84 Value below reference range        pO2, Arterial 84.7 mm Hg      HCO3, Arterial 15.6 mmol/L      Comment: 84 Value below reference range        Base Excess, Arterial -7.9 mmol/L      Comment: 84 Value below reference range        O2 Saturation, Arterial 97.0 %      Temperature 37.0 C      Barometric Pressure for Blood Gas 746 mmHg      Modality Nasal Cannula     Flow Rate 2.0 lpm      Ventilator Mode NA     Collected by [id number]     Comment: Meter: [id number]     :  [id number]       Blood Culture With ERWIN - Blood, Arm,  Right [[id number]] Collected:  11/29/18 1612    Specimen:  Blood from Arm, Right Updated:  11/30/18 1631     Blood Culture No growth at 24 hours    Peripheral Blood Smear [[id number]] Collected:  11/30/18 1332    Specimen:  Blood Updated:  11/30/18 1550     Performed by: Erica Cagle M.D.     Pathologist Interpretation --     Complete blood count and peripheral smear, review:  Absolute neutrophilia  Thrombocytosis    An absolute neutrophilia this degree and morphology is most often seen with infection or inflammation.  There is no significant left shift.  Thrombocytosis in this case is most likely an acute phase reaction secondary to the inflammation.  It is noted the patient has a urinary tract infection on this admission.          Radiology:   Imaging Results (last 24 hours)     Procedure Component Value Units Date/Time    XR Chest 1 View [[id number]] Collected:  12/01/18 0801     Updated:  12/01/18 0805    Narrative:       XR CHEST 1 VW- 12/1/2018 12:48 AM CST     HISTORY: Postop chest x-ray; N39.0-Urinary tract infection, site not  specified; R41.82-Altered mental status, unspecified;  A04.72-Enterocolitis due to Clostridium difficile, not specified as  recurrent; R13.12-Dysphagia, oropharyngeal phase; R53.1-Weakness;  Z74.09-Other reduced mobility; Z74.09-Other reduced mobility;  K56.609-Unspecified intestinal obstruction, unspecified as to partial  versus complete obstruction     COMPARISON: 11/30/2018.     FINDINGS:   An endotracheal tube has been placed and is in appropriate position. The  enteric tube extends into the stomach. The cardiomediastinal silhouette  and pulmonary vascularity are unchanged. The lungs are essentially  clear. There is atherosclerosis in the aorta.     The osseous structures and surrounding soft tissues demonstrate no acute  abnormality.       Impression:       1. Satisfactory position of the endotracheal and enteric tubes.        This report was finalized on 12/01/2018 08:02 by   Neeraj Hernandez MD.              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 Will hold on dialysis for now.   Critical care time spent was 35 minutes.       Marco Hughes MD  12/1/2018  2:19 PM

## 2018-12-01 NOTE — PROGRESS NOTES
LOS: 6 days   Patient Care Team:  Praveen Tellez MD as PCP - General (Pulmonary Disease)    Chief Complaint:  Acute abdomen    Subjective     Subjective     88-year-old female with recent hip fracture metabolic acidosis secondary to ischemic bowel, C. difficile, advanced age, severe leukocytosis with a white count of 45,000 currently 12 hours out from exploratory lap and right colon resection for necrotic ischemic bowel with a probable perforation.  Presently she is stable she has good blood pressure good urine output she is somewhat somnolent at present neurologically not returned, her ventilatory capacity is adequate minimal support but not taking spontaneous breathing, pulmonary is on the case and monitoring.  Objective      Objective     Vital Signs  Temp:  [96.6 °F (35.9 °C)-98.5 °F (36.9 °C)] 98.3 °F (36.8 °C)  Heart Rate:  [114-136] 117  Resp:  [12-29] 29  BP: ()/(23-62) 115/43  FiO2 (%):  [30 %-50 %] 30 %    Intake & Output (last 3 days)       11/28 0701 - 11/29 0700 11/29 0701 - 11/30 0700 11/30 0701 - 12/01 0700 12/01 0701 - 12/02 0700    P.O. 120       I.V. (mL/kg)   500 (8.9)     IV Piggyback  100 1200     Total Intake(mL/kg) 120 (2.2) 100 (1.8) 1700 (30.2)     Urine (mL/kg/hr) 475 (0.4) 200 (0.2) 170 (0.1)     Total Output 475 200 170     Net -355 -100 +1530                   Physical Exam:     General Appearance:    Alert, cooperative, in no acute distress   Lungs:     Clear to auscultation,respirations regular, even and                  unlabored chest x-ray is clear     Heart:    Regular rhythm and normal rate, normal S1 and S2, no            murmur, no gallop, no rub, no click   Chest Wall:    No abnormalities observed   Abdomen:    Flat abdomen, no bowel sounds, ostomy is pink, minimal drainage.     White count is reduced to 37, hematocrit stable at 32, electrolytes within normal limits.     Results Review:     I reviewed the patient's new clinical results.  I reviewed the patient's  new imaging results and agree with the interpretation.    Results from last 7 days   Lab Units  12/01/18   0528  11/30/18   0523  11/29/18   0426   WBC 10*3/mm3  37.63*  43.68*  36.97*   HEMOGLOBIN g/dL  10.5*  12.8  13.4   HEMATOCRIT %  32.0*  38.8  42.1   PLATELETS 10*3/mm3  509*  505*  472*        Results from last 7 days   Lab Units  12/01/18   1238  12/01/18   0528  12/01/18   0010  11/30/18   0523   SODIUM mmol/L  138  141   --   139   SODIUM, ARTERIAL mmol/L   --    --   139   --    POTASSIUM mmol/L  4.4  4.4   --   4.4   CHLORIDE mmol/L  109  110   --   110   CO2 mmol/L  12.0*  15.0*   --   16.0*   BUN mg/dL  47*  46*   --   38*   CREATININE mg/dL  2.50*  2.47*   --   2.04*   CALCIUM mg/dL  7.3*  7.1*   --   8.1*   BILIRUBIN mg/dL  0.8  0.7   --   0.6   ALK PHOS U/L  133*  167*   --   257*   ALT (SGPT) U/L  36  36   --   23   AST (SGOT) U/L  93*  63*   --   41   GLUCOSE mg/dL  112*  65*   --   65*       Assessment/Plan     Assessment/Plan       Acute UTI      Continue IV antibiotics, continue support with ventilator and IV fluids, we will start TPN she is been with reduced intake and albumin is 2.1, no other changes severe nutritional depletion, will need help to get off the vent.      Varun Lynch MD  12/01/18  12:56 PM      Time: time spent with patient 15 minutes     EMR Dragon/Transcription disclaimer: Much of this encounter note is an electronic transcription/translation of spoken language to printed text. The electronic translation of spoken language may permit erroneous, or at times, nonsensical words or phrases to be inadvertently transcribed; although I have reviewed the note for such errors, some may still exist.

## 2018-12-01 NOTE — ANESTHESIA PREPROCEDURE EVALUATION
Anesthesia Evaluation     Patient summary reviewed   no history of anesthetic complications:  NPO Solid Status: > 8 hours  NPO Liquid Status: > 8 hours           Airway   Mallampati: I  TM distance: >3 FB  Neck ROM: full  No difficulty expected  Dental    (+) edentulous    Pulmonary - normal exam   (+) a smoker Former, COPD severe,   Cardiovascular - normal exam  Exercise tolerance: good (4-7 METS)    (+) hypertension well controlled 2 medications or greater, hyperlipidemia,       Neuro/Psych  (+) CVA residual symptoms,     GI/Hepatic/Renal/Endo    (+)  GERD poorly controlled,    (-) no renal disease    Musculoskeletal     (+) gait problem,   Abdominal  - normal exam   Substance History - negative use     OB/GYN          Other - negative ROS                       Anesthesia Plan    ASA 4 - emergent     general     intravenous induction   Anesthetic plan, all risks, benefits, and alternatives have been provided, discussed and informed consent has been obtained with: patient.

## 2018-12-01 NOTE — PROGRESS NOTES
Nephrology ([facility]) Consult Note      Patient:  Negin Martinez  YOB: 1930  Date of Service: 11/30/2018  MRN: [id number]   Acct: [id number]   Primary Care Physician: rPaveen Tellez MD  Advance Directive:   Code Status and Medical Interventions:   Ordered at: 11/25/18 1757     Limited Support to NOT Include:    Antiarrhythmic Drugs    Cardioversion/Defibrillation    Intubation     Level Of Support Discussed With:    Patient    Health Care Surrogate    Next of Kin (If No Surrogate)     Code Status:    No CPR     Medical Interventions (Level of Support Prior to Arrest):    Limited     Admit Date: 11/25/2018       Hospital Day: 5  Referring Provider: Isrrael Zavala MD      Patient Seen, Chart, Consults, Notes, Labs, Radiology studies reviewed.        Subjective:  Negin Martinez is a 88 y.o. female  whom we were consulted for YEE. Patient is an 87 yo pleasant woman who recently having diarrhea prior to admission. She was recently  discharged from rehab where she was recovering from left femur fracture repair. She was also treated for UTI. She was progressively getting weaker with decreased appetite. Her abdomen is sore and she is now NPO. She had no diarrhea today. Her workup showed large volume intraperitoneal free fluid with bilateral small layering  pleural effusions and extensive flank edema. There was a concern about a small lesion in the pancreas head. Patient was managed with IV fluids that were held after the evidence of fluids on her CT scans. Her family has preferred to avoid diuretic use when her serum creatinine started to rise. In fact, her creatinine has risen from 0.9 (11/28) to 1.47 then to 2.04 (11/30). Renal service was consulted to manage her YEE. Patient is having some drop in her urine putput.     Allergies:  Cephalexin    Home Meds:  Medications Prior to Admission   Medication Sig Dispense Refill Last Dose   • acetaminophen (TYLENOL) 325 MG tablet  Take 650 mg by mouth Every 4 (Four) Hours As Needed for Mild Pain  or Fever.   Past Week at Unknown time   • albuterol (ACCUNEB) 1.25 MG/3ML nebulizer solution Take 1 ampule by nebulization 2 (Two) Times a Day.   8/31/2018 at 0900   • albuterol (ACCUNEB) 1.25 MG/3ML nebulizer solution Take 1 ampule by nebulization At Night As Needed for Wheezing.   Past Week at Unknown time   • albuterol (PROVENTIL HFA;VENTOLIN HFA) 108 (90 Base) MCG/ACT inhaler Inhale 2 puffs Every 4 (Four) Hours As Needed for Wheezing.      • aluminum-magnesium hydroxide-simethicone (MAALOX/MYLANTA) 200-200-20 MG/5ML suspension Take 30 mL by mouth Every 6 (Six) Hours As Needed for Indigestion or Heartburn.      • cetirizine (zyrTEC) 10 MG tablet Take 10 mg by mouth Daily As Needed for Allergies.   Past Week at Unknown time   • cycloSPORINE (RESTASIS) 0.05 % ophthalmic emulsion Administer 1 drop to both eyes Every 12 (Twelve) Hours.   8/31/2018 at 0900   • docusate sodium 100 MG capsule Take 100 mg by mouth 2 (Two) Times a Day. (Patient taking differently: Take 100 mg by mouth As Needed.)      • eszopiclone (LUNESTA) 3 MG tablet Take 3 mg by mouth Every Night. Take immediately before bedtime   8/30/2018 at 2100   • guaiFENesin (ROBITUSSIN) 100 MG/5ML syrup Take 200 mg by mouth Every 4 (Four) Hours As Needed for Cough.   Past Week at Unknown time   • magnesium hydroxide (MILK OF MAGNESIA) 2400 MG/10ML suspension suspension Take 10 mL by mouth Daily As Needed (constipation). 10 mL     • montelukast (SINGULAIR) 10 MG tablet Take 10 mg by mouth Daily.      • pantoprazole (PROTONIX) 40 MG EC tablet Take 40 mg by mouth Daily.   8/31/2018 at 0900   • polyethyl glycol-propyl glycol (SYSTANE) 0.4-0.3 % solution ophthalmic solution Administer 2 drops to both eyes Every 4 (Four) Hours As Needed (dry eyes).   Past Week at Unknown time   • sucralfate (CARAFATE) 1 GM/10ML suspension Take 1 g by mouth 3 (Three) Times a Day With Meals.      • traMADol (ULTRAM) 50  MG tablet Take 1 tablet by mouth Every 6 (Six) Hours As Needed for Moderate Pain . 12 tablet 0    • umeclidinium-vilanterol (ANORO ELLIPTA) 62.5-25 MCG/INH aerosol powder  inhaler Inhale 1 puff Daily.   8/31/2018 at 0900   • rOPINIRole (REQUIP) 0.5 MG tablet Take 0.5 mg by mouth Every Night. Take 1 hour before bedtime.   8/30/2018 at 2100       Medicines:  Current Facility-Administered Medications   Medication Dose Route Frequency Provider Last Rate Last Dose   • acetaminophen (TYLENOL) tablet 650 mg  650 mg Oral Q4H PRN Isrrael Zavala MD   650 mg at 11/29/18 0655   • albuterol (PROVENTIL) nebulizer solution 0.042% 1.25 mg/3mL  1 ampule Nebulization BID - RT Isrrael Zavala MD   1.25 mg at 11/28/18 2105   • albuterol (PROVENTIL) nebulizer solution 0.042% 1.25 mg/3mL  1 ampule Nebulization Nightly PRN Isrrael Zavala MD   1.25 mg at 11/25/18 1907   • aspirin EC tablet 81 mg  81 mg Oral Daily Isrrael Zavala MD   81 mg at 11/29/18 1043   • atorvastatin (LIPITOR) tablet 40 mg  40 mg Oral Nightly Isrrael Zavala MD   40 mg at 11/28/18 2250   • bisacodyl (DULCOLAX) suppository 10 mg  10 mg Rectal Once Calin Sam MD       • cycloSPORINE (RESTASIS) 0.05 % ophthalmic emulsion 1 drop  1 drop Both Eyes Q12H Isrrael Zavala MD   1 drop at 11/29/18 2012   • enoxaparin (LOVENOX) syringe 30 mg  30 mg Subcutaneous Q24H Isrrael Zavala MD   30 mg at 11/29/18 1823   • eszopiclone (LUNESTA) tablet 3 mg  3 mg Oral Nightly PRN Isrrael Zavala MD   3 mg at 11/28/18 2252   • famotidine (PEPCID) tablet 20 mg  20 mg Oral Daily Calin Sam MD   20 mg at 11/29/18 1043   • furosemide (LASIX) injection 40 mg  40 mg Intravenous Q12H Calin Sam MD       • guaiFENesin (ROBITUSSIN) 100 MG/5ML syrup 200 mg  200 mg Oral Q4H PRN Isrrael Zavala MD       • ipratropium-albuterol (DUO-NEB) nebulizer solution 3 mL  3 mL Nebulization Q4H PRN  Calin Sam MD       • magnesium hydroxide (MILK OF MAGNESIA) suspension 2400 mg/10mL 10 mL  10 mL Oral Daily PRN Isrrael Zavala MD       • megestrol (MEGACE) 40 MG/ML suspension 800 mg  800 mg Oral Daily Calin Sam MD   800 mg at 11/29/18 1043   • meropenem (MERREM) 1 g/100 mL 0.9% NS VTB (mbp)  1 g Intravenous Once Brenda Finney MD       • [START ON 12/1/2018] meropenem (MERREM) 500mg/100 mL 0.9% NS IVPB (mbp)  500 mg Intravenous Q12H Brenda Finney MD       • metroNIDAZOLE (FLAGYL) IVPB 500 mg  500 mg Intravenous Q8H Calin Sam MD 0 mL/hr at 11/30/18 0512 500 mg at 11/30/18 1310   • montelukast (SINGULAIR) tablet 10 mg  10 mg Oral Daily Isrrael Zavala MD   10 mg at 11/29/18 1043   • nystatin (MYCOSTATIN) 979353 UNIT/ML suspension 500,000 Units  5 mL Swish & Swallow 4x Daily Calin Sam MD   500,000 Units at 11/29/18 1706   • ondansetron (ZOFRAN) injection 4 mg  4 mg Intravenous Q6H PRN Isrrael Zavala MD   4 mg at 11/28/18 2250   • Pharmacy to Dose meropenem (MERREM)   Does not apply Continuous PRN Brenda Finney MD       • rOPINIRole (REQUIP) tablet 0.5 mg  0.5 mg Oral Nightly Isrrael Zavala MD   0.5 mg at 11/29/18 2012   • saccharomyces boulardii (FLORASTOR) capsule 500 mg  500 mg Oral BID Isrrael Zavala MD   500 mg at 11/29/18 2012   • sodium chloride 0.9 % flush 10 mL  10 mL Intravenous PRN Mauro Magallanes Jr., MD       • sodium chloride 0.9 % flush 3 mL  3 mL Intravenous Q12H Isrrael Zavala MD   3 mL at 11/30/18 1735   • sodium chloride 0.9 % flush 3-10 mL  3-10 mL Intravenous PRN Isrrael Zavala MD       • sodium chloride 0.9 % infusion  125 mL/hr Intravenous Continuous Merissa Smith APRN 125 mL/hr at 11/30/18 0412 125 mL/hr at 11/30/18 0412   • sucralfate (CARAFATE) 1 GM/10ML suspension 1 g  1 g Oral TID With Meals Isrrael Zavala MD   1 g at 11/29/18 8834    • tamsulosin (FLOMAX) 24 hr capsule 0.4 mg  0.4 mg Oral Daily Calin Sam MD   0.4 mg at 11/29/18 1043   • traMADol (ULTRAM) tablet 50 mg  50 mg Oral Q6H PRN Isrrael Zavala MD   50 mg at 11/29/18 1150       Past Medical History:  Past Medical History:   Diagnosis Date   • COPD (chronic obstructive pulmonary disease) (CMS/[facility])    • GERD (gastroesophageal reflux disease)    • Hyperlipidemia    • Hypertension    • Restless leg    • Stroke (CMS/HCC)     residual left weakness        Past Surgical History:  Past Surgical History:   Procedure Laterality Date   • APPENDECTOMY     • CHOLECYSTECTOMY     • PARTIAL HIP ARTHROPLASTY Left    • REPLACEMENT TOTAL KNEE Left    • TONSILLECTOMY         Family History  History reviewed. No pertinent family history.    Social History  Social History     Socioeconomic History   • Marital status:      Spouse name: Not on file   • Number of children: Not on file   • Years of education: Not on file   • Highest education level: Not on file   Social Needs   • Financial resource strain: Not on file   • Food insecurity - worry: Not on file   • Food insecurity - inability: Not on file   • Transportation needs - medical: Not on file   • Transportation needs - non-medical: Not on file   Occupational History   • Not on file   Tobacco Use   • Smoking status: Former Smoker   • Smokeless tobacco: Never Used   • Tobacco comment: quit in 1990   Substance and Sexual Activity   • Alcohol use: No   • Drug use: No   • Sexual activity: Not on file   Other Topics Concern   • Not on file   Social History Narrative   • Not on file         Review of Systems:  History obtained from chart review and the patient  General ROS: No fever or chills  Respiratory ROS: No cough, shortness of breath, wheezing  Cardiovascular ROS: no chest pain or dyspnea on exertion  Gastrointestinal ROS: No abdominal pain or melena  Genito-Urinary ROS: No dysuria or hematuria  14 point ROS reviewed with the  "patient and negative except as noted above and in the HPI unless unable to obtain.    Objective:  /62 (BP Location: Right arm, Patient Position: Lying)   Pulse 114   Temp 96.6 °F (35.9 °C) (Temporal)   Resp 18   Ht 157.5 cm (62.01\")   Wt 54.7 kg (120 lb 8 oz)   SpO2 90%   BMI 22.03 kg/m²     Intake/Output Summary (Last 24 hours) at 11/30/2018 1851  Last data filed at 11/30/2018 0412  Gross per 24 hour   Intake 100 ml   Output 200 ml   Net -100 ml     General: awake/alert   Chest:  clear to auscultation bilaterally without respiratory distress  CVS: regular rate and rhythm  Abdominal: soft, nontender, normal bowel sounds  Extremities: no cyanosis or edema  Skin: warm and dry without rash  Neuro: No focal motor deficits  Musculoskeletal: No obvious joint effusions    Labs:  Lab Results (last 72 hours)     Procedure Component Value Units Date/Time    Blood Culture With ERWIN - Blood, Arm, Right [[id number]] Collected:  11/29/18 1612    Specimen:  Blood from Arm, Right Updated:  11/30/18 1631     Blood Culture No growth at 24 hours    Peripheral Blood Smear [[id number]] Collected:  11/30/18 1332    Specimen:  Blood Updated:  11/30/18 1550     Performed by: Erica Cagle M.D.     Pathologist Interpretation --     Complete blood count and peripheral smear, review:  Absolute neutrophilia  Thrombocytosis    An absolute neutrophilia this degree and morphology is most often seen with infection or inflammation.  There is no significant left shift.  Thrombocytosis in this case is most likely an acute phase reaction secondary to the inflammation.  It is noted the patient has a urinary tract infection on this admission.    Lactic Acid, Plasma [[id number]]  (Normal) Collected:  11/30/18 1332    Specimen:  Blood Updated:  11/30/18 1355     Lactate 2.0 mmol/L     Blood Culture - Blood, Wrist, Left [[id number]] Collected:  11/25/18 1205    Specimen:  Blood from Wrist, Left Updated:  11/30/18 1245     Blood Culture No growth " at 5 days    Blood Culture - Blood, Arm, Right [[id number]] Collected:  11/25/18 1205    Specimen:  Blood from Arm, Right Updated:  11/30/18 1245     Blood Culture No growth at 5 days    CBC & Differential [[id number]] Collected:  11/30/18 0523    Specimen:  Blood Updated:  11/30/18 0626    Narrative:       The following orders were created for panel order CBC & Differential.  Procedure                               Abnormality         Status                     ---------                               -----------         ------                     Manual Differential[[id number]]          Abnormal            Final result               CBC Auto Differential[[id number]]        Abnormal            Final result                 Please view results for these tests on the individual orders.    CBC Auto Differential [[id number]]  (Abnormal) Collected:  11/30/18 0523    Specimen:  Blood Updated:  11/30/18 0626     WBC 43.68 10*3/mm3      RBC 4.45 10*6/mm3      Hemoglobin 12.8 g/dL      Hematocrit 38.8 %      MCV 87.2 fL      MCH 28.8 pg      MCHC 33.0 g/dL      RDW 15.4 %      RDW-SD 49.7 fl      MPV 11.1 fL      Platelets 505 10*3/mm3     Manual Differential [[id number]]  (Abnormal) Collected:  11/30/18 0523    Specimen:  Blood Updated:  11/30/18 0626     Neutrophil % 97.0 %      Lymphocyte % 0.0 %      Monocyte % 2.0 %      Bands %  1.0 %      Neutrophils Absolute 42.80 10*3/mm3      Lymphocytes Absolute 0.00 10*3/mm3      Monocytes Absolute 0.87 10*3/mm3      Anisocytosis Slight/1+     Crenated RBC's Large/3+     Poikilocytes Large/3+     WBC Morphology Normal     Platelet Estimate Increased    Comprehensive Metabolic Panel [[id number]]  (Abnormal) Collected:  11/30/18 0523    Specimen:  Blood Updated:  11/30/18 0617     Glucose 65 mg/dL      BUN 38 mg/dL      Creatinine 2.04 mg/dL      Sodium 139 mmol/L      Potassium 4.4 mmol/L      Chloride 110 mmol/L      CO2 16.0 mmol/L      Calcium 8.1 mg/dL      Total Protein 4.9 g/dL       Albumin 2.10 g/dL      ALT (SGPT) 23 U/L      AST (SGOT) 41 U/L      Alkaline Phosphatase 257 U/L      Total Bilirubin 0.6 mg/dL      eGFR Non African Amer 23 mL/min/1.73      Globulin 2.8 gm/dL      A/G Ratio 0.8 g/dL      BUN/Creatinine Ratio 18.6     Anion Gap 13.0 mmol/L     Narrative:       The MDRD GFR formula is only valid for adults with stable renal function between ages 18 and 70.    Lactic Acid, Plasma [[id number]]  (Normal) Collected:  11/29/18 2304    Specimen:  Blood Updated:  11/29/18 2323     Lactate 1.3 mmol/L     CBC & Differential [[id number]] Collected:  11/29/18 0426    Specimen:  Blood Updated:  11/29/18 0600    Narrative:       The following orders were created for panel order CBC & Differential.  Procedure                               Abnormality         Status                     ---------                               -----------         ------                     Manual Differential[[id number]]          Abnormal            Final result               CBC Auto Differential[[id number]]        Abnormal            Final result                 Please view results for these tests on the individual orders.    CBC Auto Differential [[id number]]  (Abnormal) Collected:  11/29/18 0426    Specimen:  Blood Updated:  11/29/18 0600     WBC 36.97 10*3/mm3      RBC 4.77 10*6/mm3      Hemoglobin 13.4 g/dL      Hematocrit 42.1 %      MCV 88.3 fL      MCH 28.1 pg      MCHC 31.8 g/dL      RDW 15.3 %      RDW-SD 49.5 fl      MPV 11.3 fL      Platelets 472 10*3/mm3     Manual Differential [[id number]]  (Abnormal) Collected:  11/29/18 0426    Specimen:  Blood Updated:  11/29/18 0600     Neutrophil % 89.1 %      Lymphocyte % 0.0 %      Monocyte % 2.2 %      Bands %  6.5 %      Atypical Lymphocyte % 2.2 %      Neutrophils Absolute 35.36 10*3/mm3      Lymphocytes Absolute 0.00 10*3/mm3      Monocytes Absolute 0.81 10*3/mm3      Crenated RBC's Mod/2+     Poikilocytes Mod/2+     WBC Morphology Normal     Platelet Estimate  Increased    Basic Metabolic Panel [[id number]]  (Abnormal) Collected:  11/29/18 0426    Specimen:  Blood Updated:  11/29/18 0558     Glucose 84 mg/dL      BUN 29 mg/dL      Creatinine 1.47 mg/dL      Sodium 138 mmol/L      Potassium 4.5 mmol/L      Chloride 105 mmol/L      CO2 20.0 mmol/L      Calcium 8.5 mg/dL      eGFR Non African Amer 34 mL/min/1.73      BUN/Creatinine Ratio 19.7     Anion Gap 13.0 mmol/L     Narrative:       The MDRD GFR formula is only valid for adults with stable renal function between ages 18 and 70.    Basic Metabolic Panel [[id number]]  (Abnormal) Collected:  11/28/18 0705    Specimen:  Blood Updated:  11/28/18 0846     Glucose 100 mg/dL      BUN 21 mg/dL      Creatinine 0.90 mg/dL      Sodium 138 mmol/L      Potassium 4.4 mmol/L      Chloride 107 mmol/L      CO2 22.0 mmol/L      Calcium 8.5 mg/dL      eGFR Non African Amer 59 mL/min/1.73      BUN/Creatinine Ratio 23.3     Anion Gap 9.0 mmol/L     Narrative:       The MDRD GFR formula is only valid for adults with stable renal function between ages 18 and 70.    CBC & Differential [[id number]] Collected:  11/28/18 0705    Specimen:  Blood Updated:  11/28/18 0805    Narrative:       The following orders were created for panel order CBC & Differential.  Procedure                               Abnormality         Status                     ---------                               -----------         ------                     CBC Auto Differential[[id number]]        Abnormal            Final result                 Please view results for these tests on the individual orders.    CBC Auto Differential [[id number]]  (Abnormal) Collected:  11/28/18 0705    Specimen:  Blood Updated:  11/28/18 0805     WBC 25.57 10*3/mm3      RBC 4.47 10*6/mm3      Hemoglobin 12.8 g/dL      Hematocrit 39.0 %      MCV 87.2 fL      MCH 28.6 pg      MCHC 32.8 g/dL      RDW 15.0 %      RDW-SD 48.4 fl      MPV 11.4 fL      Platelets 460 10*3/mm3      Neutrophil % 84.3 %       Lymphocyte % 4.4 %      Monocyte % 9.0 %      Eosinophil % 0.9 %      Basophil % 0.4 %      Immature Grans % 1.0 %      Neutrophils, Absolute 21.57 10*3/mm3      Lymphocytes, Absolute 1.12 10*3/mm3      Monocytes, Absolute 2.29 10*3/mm3      Eosinophils, Absolute 0.23 10*3/mm3      Basophils, Absolute 0.10 10*3/mm3      Immature Grans, Absolute 0.26 10*3/mm3      nRBC 0.0 /100 WBC           Radiology:   Imaging Results (last 72 hours)     Procedure Component Value Units Date/Time    XR Abdomen KUB [[id number]] Collected:  11/30/18 1226     Updated:  11/30/18 1230    Narrative:       XR ABDOMEN KUB- 11/30/2018 11:32 AM CST     HISTORY: Abnormal CAT scan. Gaseous distention of the bowel. Ascites.       COMPARISON: CT 11/29/2018     FINDINGS:  Gaseous distention of the small bowel loops is again noted. A Damian  catheter is present. Atherosclerotic calcifications are seen.     Evaluation for free intraperitoneal air is limited on a supine  radiograph, but there are no definite findings of free intraperitoneal  air.      The osseous structures demonstrate no acute abnormality.        Impression:       1. Gaseous distention of the small bowel could be due to obstruction or  ileus.         This report was finalized on 11/30/2018 12:27 by Dr. Neeraj Hernandez MD.    XR Chest 1 View [[id number]] Collected:  11/30/18 1219     Updated:  11/30/18 1223    Narrative:       Exam:   XR CHEST 1 VW-       Date:  11/30/2018      History:  Female, age  88 years;fluid?; N39.0-Urinary tract infection,  site not specified; R41.82-Altered mental status, unspecified;  A04.72-Enterocolitis due to Clostridium difficile, not specified as  recurrent; R13.12-Dysphagia, oropharyngeal phase; R53.1-Weakness;  Z74.09-Other reduced mobility; Z74.09-Other reduced mobility     COMPARISON:  None.     Findings :     The heart and mediastinum are normal in size. Lungs are without focal  infiltrate, mass or effusions. Right paratracheal calcified  granuloma  redemonstrated.  The bones show no acute pathology.         Impression:       Impression:     No acute cardiopulmonary disease.     This report was finalized on 11/30/2018 12:20 by Dr. Nuria Cornell MD.    CT Abdomen Pelvis Without Contrast [[id number]] Collected:  11/29/18 1414     Updated:  11/29/18 1437    Narrative:       CT ABDOMEN AND PELVIS without contrast 11/29/2018 12:28 PM CST     HISTORY: Abdominal pain     COMPARISON: CT scan dated 9/5/2018      DLP: 282 mGy cm     TECHNIQUE: Noncontrast enhanced images of the abdomen and pelvis  obtained without oral contrast.      FINDINGS:   Bilateral small layering pleural effusions. No lung consolidation. There  is a small pericardial effusion.      LIVER: Grossly normal.      BILIARY SYSTEM: Color appears surgically absent. Biliary tree is  nondilated.      PANCREAS: 2.1 cm oval low-attenuation cystic lesion at the pancreatic  head neck junction is less well seen on this exam is compared with the  prior.      SPLEEN: Normal in size. There is a slightly heterogeneous appearance of  the no discrete lesion is seen.      KIDNEYS AND ADRENALS: Adrenal glands are unremarkable. No hydronephrosis  identified. The ureters are decompressed and normal in appearance.     RETROPERITONEUM: No mass, lymphadenopathy or hemorrhage.      GI TRACT: Fluid identified in a nondistended distal esophagus. The  stomach is nondistended. The small bowel is near completely  decompressed. Moderate stool identified in the ascending colon.  Air-fluid levels are identified in the ascending and proximal transverse  colon with the colon measuring up to 3.7 cm. The descending and sigmoid  colon are completely decompressed.     OTHER: Large volume intraperitoneal free fluid throughout the abdomen.  No gross intraperitoneal free air. No organized extraluminal collection  identified. Osseous structures and soft tissues demonstrate no worrisome  lesions. Lower thoracic and upper lumbar  compression deformities are  chronic. Prominent bilateral flank edema.     PELVIS: No mass lesion, fluid collection or significant lymphadenopathy  is seen in the pelvis. The Damian catheter is in place. The Damian  catheter loops within the urinary bladder. The bladder is moderately  distended.       Impression:       1. Large volume intraperitoneal free fluid with bilateral small layering  pleural effusions and extensive flank edema.  2. Again seen is a rounded low-attenuation lesion at the pancreatic head  neck junction measuring up to 2.1 cm. This appears to be arising within  the pancreatic parenchyma. Pancreatic neoplasm is certainly a  consideration.  3. The bowel is nondistended. Difficult to evaluate for bowel wall  thickening without IV contrast. There does not appear to be evidence for  viscous perforation or peritoneal abscess.  4. Damian catheter is looped within the urinary bladder.        This report was finalized on 11/29/2018 14:34 by Dr Peterson Morris, .          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 Pancreatitis ?)  -UTI    Plan:  Patient was seen by ID and antibiotherapy will be deferred to them. I advise restarting IV fluids although patient is third spacing due to inflammation and hypoalbuminemia. Would use diuretics only when intravascular volume is reestablished. Avoid hypotension and nephrotoxins. Surgical eval is pending. Should also consider GI evaluation. Will repeat amylase and lipase. May need to consider TPN. If condition worsens, may then need to upgrade care to critical care unit. Will also get ABGs to better explain her acid-base disorders.       Thank you for the consult, we appreciate the opportunity to  provide care to your patients.  Feel free to contact me if I can be of any further assistance.      Marco Hughes MD  11/30/2018  6:51 PM

## 2018-12-01 NOTE — NURSING NOTE
1825 PACU ETC02 22 / SP02 100 / ETT-TUBE SECURED W/ ANCHOR-FAST @ 22 CM LIP. TRANSPORTED TO CCU9 W/ RESUS BAG 15 LPM 02 / BSA AND KIRSTIN. /ARRIVED TO CCU9 etc02 24 / sp02 100%. ett-tube 22 @ lip . Placed pt. On ventilator w/ same settings/alarms. REPORT GIVEN TO LAURA RICCI RRT

## 2018-12-01 NOTE — PLAN OF CARE
Problem: Patient Care Overview  Goal: Plan of Care Review  Outcome: Ongoing (interventions implemented as appropriate)   11/30/18 1949   Coping/Psychosocial   Plan of Care Reviewed With patient   Plan of Care Review   Progress declining   OTHER   Outcome Summary pt continues to c/o pain to entire abdomen. NPO. ID consult, nephrology consult and surgery consult. No output in head today. No BM this shift. Family at bedside. VSS. WBC 43.68. Dr Lynch just left bedside and plans for surgery tonight.        Problem: Fall Risk (Adult)  Goal: Absence of Fall  Outcome: Ongoing (interventions implemented as appropriate)      Problem: Skin Injury Risk (Adult)  Goal: Skin Health and Integrity  Outcome: Ongoing (interventions implemented as appropriate)      Problem: Urinary Tract Infection (Adult)  Goal: Signs and Symptoms of Listed Potential Problems Will be Absent, Minimized or Managed (Urinary Tract Infection)  Outcome: Ongoing (interventions implemented as appropriate)      Problem: Nutrition, Imbalanced: Inadequate Oral Intake (Adult)  Goal: Improved Oral Intake  Outcome: Ongoing (interventions implemented as appropriate)    Goal: Prevent Further Weight Loss  Outcome: Ongoing (interventions implemented as appropriate)

## 2018-12-01 NOTE — PROGRESS NOTES
"Pharmacy Renal Dose Conversion    Negin Martinez is a 88 y.o. year old female  154.9 cm (61\") 56.3 kg (124 lb 1.6 oz)    Estimated Creatinine Clearance: 13.8 mL/min (A) (by C-G formula based on SCr of 2.5 mg/dL (H)).   Lab Results   Component Value Date    CREATININE 2.50 (H) 12/01/2018    CREATININE 2.47 (H) 12/01/2018    CREATININE 2.04 (H) 11/30/2018       PLAN  Based on prescribing information provided by the drug , Enoxaparin 30mg sq Q12H,  has been changed to Enoxaparin 30mg sq Q24H    Adjusted per the directives and guidelines established by [facility] Pharmacy and Therapeutics Committee and [facility] Medical Executive Committee.  Pharmacy will continue to monitor daily and make further adjustment(s) accordingly.    Evaristo Nascimento, [facility]  12/01/181:08 PM    "

## 2018-12-01 NOTE — ANESTHESIA PROCEDURE NOTES
ANESTHESIA INTUBATION  Urgency: emergent    Airway not difficult    General Information and Staff    Patient location during procedure: OR  CRNA: Fabian Balbuena CRNA    Indications and Patient Condition  Indications for airway management: airway protection    Preoxygenated: yes  Mask difficulty assessment: 1 - vent by mask    Final Airway Details  Final airway type: endotracheal airway      Successful airway: ETT    Successful intubation technique: direct laryngoscopy  Endotracheal tube insertion site: oral  Blade: Vicki  Blade size: 3.5.  ETT size (mm): 7.5  Cormack-Lehane Classification: grade I - full view of glottis  Placement verified by: chest auscultation and capnometry   Measured from: gums  Number of attempts at approach: 1

## 2018-12-01 NOTE — PLAN OF CARE
Problem: Patient Care Overview  Goal: Plan of Care Review  Outcome: Ongoing (interventions implemented as appropriate)   12/01/18 0805   Plan of Care Review   Progress declining   OTHER   Outcome Summary SLP received discontinue order from MD. Chart review showed patient to be on vent s/p surgery. SLP will sign off on patient. Please reconsult when extubated and ST warranted. Thanks.

## 2018-12-01 NOTE — ANESTHESIA POSTPROCEDURE EVALUATION
"Patient: Negin Martinez    Procedure Summary     Date:  11/30/18 Room / Location:  [facility] OR  /  [facility] OR    Anesthesia Start:  2059 Anesthesia Stop:  2342    Procedure:  LAPAROTOMY EXPLORATORY RIGHT COLECTOMY (N/A Abdomen) Diagnosis:      Surgeon:  Varun Lynch MD Provider:  Fabian Balbuena CRNA    Anesthesia Type:  general ASA Status:  4 - Emergent          Anesthesia Type: general  Last vitals  BP   (!) 71/39 (12/01/18 0700)   Temp   98.5 °F (36.9 °C) (12/01/18 0625)   Pulse   120 (12/01/18 0811)   Resp   22 (12/01/18 0811)     SpO2   100 % (12/01/18 0801)     Post Anesthesia Care and Evaluation    Patient location during evaluation: ICU  Patient participation: complete - patient cannot participate  Pain management: adequate  Airway patency: patent  Anesthetic complications: No anesthetic complications  PONV Status: NA  Cardiovascular status: acceptable  Respiratory status: ETT, intubated and ventilator  Hydration status: acceptable    Comments: Blood pressure (!) 71/39, pulse 120, temperature 98.5 °F (36.9 °C), temperature source Tympanic, resp. rate 22, height 154.9 cm (61\"), weight 56.3 kg (124 lb 1.6 oz), SpO2 100 %.    Pt discharged from PACU based on almaz score >8      "

## 2018-12-01 NOTE — PROGRESS NOTES
"    [facility] Medicine Services  INPATIENT PROGRESS NOTE    Patient Name: Negin Martinez  Date of Admission: 11/25/2018  Today's Date: 12/01/18  Length of Stay: 6  Primary Care Physician: Praveen Tellez MD    Subjective   Chief Complaint: follow up  HPI   Complex case as stated yesterday with labs and clinical picture not coinciding and felt best course of action yesterday was have surgeon look at her.   Appreciate consultants  Patient had x lap last night by Dr. Lynch.  'dead bowel cecum\" per discussion  Remains intubated (post op respiratory failure)  Appreciate consultants          Review of Systems   On vent. No sedation.    Objective    Temp:  [96.6 °F (35.9 °C)-98.5 °F (36.9 °C)] 98.5 °F (36.9 °C)  Heart Rate:  [114-136] 136  Resp:  [12-27] 15  BP: ()/(23-72) 97/58  FiO2 (%):  [30 %-50 %] 30 %  Physical Exam  Hemodynamically stable. [facility]  She is laying flat in bed.  No gross distress, flat affect  No distress  Constitutional: drowsy, lethargic   edentulous  HENT:   Head: Normocephalic and atraumatic.   R  Nose: Nose normal.     Eyes: Conjunctivae and EOM are normal.   Neck: Normal range of motion. Neck supple.   Cardiovascular: Normal rate, regular rhythm and normal heart sounds.   Pulmonary/Chest: intubated, cta; simv, 50% Fio2 ,  rr 10 and PEEP 5 PSV of 10 .  Abdominal: Soft. Hypoactive .  + ostomy, + packing on surgical wound, no gross discharge.  Musculoskeletal:  Huge immobilizer on her left LE  Neurological: eyes jose shut close; drowsy/lethargic  Skin: Skin is warm and dry.   Psychiatric: She has a  flat  affect.   Warm dry skin         Results Review:  I have reviewed the labs, radiology results, and diagnostic studies.    Laboratory Data:   Results from last 7 days   Lab Units  12/01/18   0528  11/30/18   0523  11/29/18   0426   WBC 10*3/mm3  37.63*  43.68*  36.97*   HEMOGLOBIN g/dL  10.5*  12.8  13.4   HEMATOCRIT %  32.0*  38.8  42.1 "   PLATELETS 10*3/mm3  509*  505*  472*        Results from last 7 days   Lab Units  12/01/18   0528  12/01/18   0010  11/30/18   0523  11/29/18   0426   11/25/18   1209   SODIUM mmol/L  141   --   139  138   < >  132*   SODIUM, ARTERIAL mmol/L   --   139   --    --    --    --    POTASSIUM mmol/L  4.4   --   4.4  4.5   < >  4.1   CHLORIDE mmol/L  110   --   110  105   < >  96*   CO2 mmol/L  15.0*   --   16.0*  20.0*   < >  28.0   BUN mg/dL  46*   --   38*  29*   < >  38*   CREATININE mg/dL  2.47*   --   2.04*  1.47*   < >  1.01   CALCIUM mg/dL  7.1*   --   8.1*  8.5   < >  9.0   BILIRUBIN mg/dL  0.7   --   0.6   --    --   0.9   ALK PHOS U/L  167*   --   257*   --    --   261*   ALT (SGPT) U/L  36   --   23   --    --   34   AST (SGOT) U/L  63*   --   41   --    --   60*   GLUCOSE mg/dL  65*   --   65*  84   < >  108*    < > = values in this interval not displayed.       Culture Data:   Blood Culture   Date Value Ref Range Status   11/29/2018 No growth at 24 hours  Preliminary   11/25/2018 No growth at 5 days  Final   11/25/2018 No growth at 5 days  Final     Urine Culture   Date Value Ref Range Status   11/25/2018 >100,000 CFU/mL Citrobacter freundii (A)  Final     Wound Culture   Date Value Ref Range Status   11/30/2018 No growth at 24 hours  Preliminary       Radiology Data:   Imaging Results (last 24 hours)     Procedure Component Value Units Date/Time    XR Chest 1 View [[id number]] Collected:  12/01/18 0801     Updated:  12/01/18 0805    Narrative:       XR CHEST 1 VW- 12/1/2018 12:48 AM CST     HISTORY: Postop chest x-ray; N39.0-Urinary tract infection, site not  specified; R41.82-Altered mental status, unspecified;  A04.72-Enterocolitis due to Clostridium difficile, not specified as  recurrent; R13.12-Dysphagia, oropharyngeal phase; R53.1-Weakness;  Z74.09-Other reduced mobility; Z74.09-Other reduced mobility;  K56.609-Unspecified intestinal obstruction, unspecified as to partial  versus complete  obstruction     COMPARISON: 11/30/2018.     FINDINGS:   An endotracheal tube has been placed and is in appropriate position. The  enteric tube extends into the stomach. The cardiomediastinal silhouette  and pulmonary vascularity are unchanged. The lungs are essentially  clear. There is atherosclerosis in the aorta.     The osseous structures and surrounding soft tissues demonstrate no acute  abnormality.       Impression:       1. Satisfactory position of the endotracheal and enteric tubes.        This report was finalized on 12/01/2018 08:02 by Dr. Neeraj Hernandez MD.    XR Abdomen KUB [[id number]] Collected:  11/30/18 1226     Updated:  11/30/18 1230    Narrative:       XR ABDOMEN KUB- 11/30/2018 11:32 AM CST     HISTORY: Abnormal CAT scan. Gaseous distention of the bowel. Ascites.       COMPARISON: CT 11/29/2018     FINDINGS:  Gaseous distention of the small bowel loops is again noted. A Damian  catheter is present. Atherosclerotic calcifications are seen.     Evaluation for free intraperitoneal air is limited on a supine  radiograph, but there are no definite findings of free intraperitoneal  air.      The osseous structures demonstrate no acute abnormality.        Impression:       1. Gaseous distention of the small bowel could be due to obstruction or  ileus.         This report was finalized on 11/30/2018 12:27 by Dr. Neeraj Hernandez MD.    XR Chest 1 View [[id number]] Collected:  11/30/18 1219     Updated:  11/30/18 1223    Narrative:       Exam:   XR CHEST 1 VW-       Date:  11/30/2018      History:  Female, age  88 years;fluid?; N39.0-Urinary tract infection,  site not specified; R41.82-Altered mental status, unspecified;  A04.72-Enterocolitis due to Clostridium difficile, not specified as  recurrent; R13.12-Dysphagia, oropharyngeal phase; R53.1-Weakness;  Z74.09-Other reduced mobility; Z74.09-Other reduced mobility     COMPARISON:  None.     Findings :     The heart and mediastinum are normal in size. Lungs  are without focal  infiltrate, mass or effusions. Right paratracheal calcified granuloma  redemonstrated.  The bones show no acute pathology.         Impression:       Impression:     No acute cardiopulmonary disease.     This report was finalized on 11/30/2018 12:20 by Dr. Nuria Cornell MD.        Obi's Test Positive    pH, Arterial 7.168 Critically low   pH units   pCO2, Arterial 43.0 mm Hg   pO2, Arterial 199.0 Abnormally high   mm Hg   HCO3, Arterial 15.6 Abnormally low   mmol/L   Base Excess, Arterial -12.2 Abnormally low   mmol/L   O2 Saturation, Arterial 100.0 Abnormally high   %   Hemoglobin, Blood Gas 9.0 Abnormally low   g/dL   Hematocrit, Blood Gas 27.5 Abnormally low   %   Oxyhemoglobin 98.5 %   Methemoglobin 0.60 %   Carboxyhemoglobin 1.0 %   A-a Gradiant  mmHg   Temperature 37.0 C   Sodium, Arterial 139 mmol/L   Potassium, Arterial 4.2 mmol/L   Ionized Calcium 4.32 Abnormally low   mg/dL   Barometric Pressure for Blood Gas 744 mmHg   Modality Ventilator    FIO2 50 %   Ventilator Mode SIMV    Set Tidal Volume 500    Set Mech Resp Rate 10.0    PEEP 5.0    PSV 10.0      Urinalysis With Microscopic If Indicated (No Culture) - Urine, Clean Catch [[id number]]   (Abnormal)   Urine, Clean Catch     Final result  Color, UA Yellow   Appearance, UA Cloudy Abnormal    pH, UA <=5.0   Specific Gravity, UA 1.016   Glucose, UA Negative   Ketones, UA Trace Abnormal    Bilirubin, UA Negative   Blood, UA Large (3+) Abnormal    Protein,  mg/dL (2+) Abnormal    Leuk Esterase, UA Large (3+) Abnormal    Nitrite, UA Negative   Urobilinogen, UA 0.2 E.U./dL              12/01/2018 0123  12/01/2018 0146  Urinalysis, Microscopic Only - Urine, Clean Catch [[id number]]   (Abnormal)   Urine, Clean Catch     Final result  RBC, UA 31-50 Abnormal  /HPF   WBC, UA Too Numerous to Count Abnormal  /HPF   Bacteria, UA Trace Abnormal  /HPF   Squamous Epithelial Cells, UA 0-2 /HPF   Renal Epithelial Cells, UA 0-2 /HPF   Hyaline Casts,  UA 13-20 /LPF   Methodology: Manual Light Microscopy         12/01/2018 0528 12/01/2018 0553  Amylase [[id number]]   (Abnormal)   Blood     Final result  Amylase 1,782 Abnormally high  U/L          12/01/2018 0528  12/01/2018 0602  Lipase [[id number]]   (Abnormal)   Blood     Final result  Lipase 2,981 Abnormally high  U/L                I have reviewed the patient's current medications.     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 Difficile associated diarrhea - changed to IV metronidazole due to NPO status  · History of left  hip fracture in September 2018 with immobilizer  · COPD no exacerbation   · Gastroesophageal reflux disease  · Hyperlipidemia  · Hypodensity on recent CT (pancreas)  would plan to repeat in an outpatient setting  · Hyponatremia  · Urinary retention - this time required fc;       PICC line  TPN  Pulmonary consult for vent management  Defer abx management to ID   Post op care per Dr. Lynch  dvt and stress ulcer prophylaxis  Supportive care  Appreciate consultants  Family apprised of condition and plan of care  Prognosis guarded    albumin human 25 g Intravenous Once   albuterol 1 ampule Nebulization BID - RT   aspirin 81 mg Oral Daily   atorvastatin 40 mg Oral Nightly   cycloSPORINE 1 drop Both Eyes Q12H   enoxaparin 30 mg Subcutaneous Q12H   famotidine 20 mg Intravenous Q12H   Or      famotidine 20 mg Oral Q12H   lactated ringers 1,000 mL Intravenous Once   meropenem 500 mg Intravenous Q12H   meropenem 500 mg Intravenous Q12H   metroNIDAZOLE 500 mg Intravenous Q6H   montelukast 10 mg Oral Daily    nystatin 5 mL Swish & Swallow 4x Daily   rOPINIRole 0.5 mg Oral Nightly   saccharomyces boulardii 500 mg Oral BID   sodium chloride 3 mL Intravenous Q12H   sucralfate 1 g Oral Q6H   tamsulosin 0.4 mg Oral Daily             Discharge Planning:  To be determined.    Calin Sam MD   12/01/18   11:08 AM

## 2018-12-01 NOTE — CONSULTS
Pt had 5 Eston[place] dual lumen PICC placed in right brachial vein. Pt tolerated procedure well. 30 cm of catheter internal and 0 cm external. Tip confirmation by 3cg. All lumens flush and draw well. Sterile dressing applied.

## 2018-12-02 NOTE — NURSING NOTE
Patients vital signs appear to be responding to the fluid/albumin bolus but the patients skin is mottling more as the day progresses especially at her feet. Her stoma also appears to be darkening. Family is aware and are calling family members to visit.

## 2018-12-02 NOTE — PROGRESS NOTES
PULMONARY AND CRITICAL CARE PROGRESS NOTE - [facility]    Patient: Negin Martinez    1/10/1930    MR# [id number]    Acct# [id number]  12/02/18   10:10 AM  Referring Provider: Calin Sam*    Chief Complaint: Mechanically ventilated    Interval history: She remains intubated this morning.  She has multiple family members at bedside.  TPN, bicarbonate and Intralipid are infusing.  She is minimally responsive, despite not being on any sedation. ABG's are stable and today's CXR has been ordered and is pending. WBC is trending down slightly, currently at 33, she is afebrile. Urine output has been very minimal and she is hypotensive. She is s/p bowel resection with ostomy formation from 11-30-18.     Meds:    albuterol 1 ampule Nebulization BID - RT   aspirin 81 mg Oral Daily   atorvastatin 40 mg Oral Nightly   cycloSPORINE 1 drop Both Eyes Q12H   enoxaparin 30 mg Subcutaneous Q24H   famotidine 20 mg Intravenous Daily   fat emulsion 250 mL Intravenous Q24H (TPN)   insulin lispro 0-9 Units Subcutaneous Q6H   lactated ringers 500 mL Intravenous Once   meropenem 500 mg Intravenous Q12H   montelukast 10 mg Oral Daily   nystatin 5 mL Swish & Swallow 4x Daily   rOPINIRole 0.5 mg Oral Nightly   saccharomyces boulardii 500 mg Oral BID   sodium chloride 3 mL Intravenous Q12H   sodium hypochlorite  Topical Q12H   sucralfate 1 g Oral Q6H   tamsulosin 0.4 mg Oral Daily       Adult Standard Central TPN  Last Rate: 55 mL/hr at 12/01/18 1758   Pharmacy to Dose meropenem (MERREM)     sodium bicarbonate drip (greater than 75 mEq/bag) 75 mL/hr Last Rate: 75 mL/hr (12/02/18 0721)     Review of Systems:   Cannot obtain due to mechanical ventilation.  The patient notably is critically ill and connected to a ventilator.  As such patient cannot communicate and provide any history whatsoever, including any history of present illness or interval history since arrival or review of systems. The interested reviewer may  note this fact, as an attempt has been made at collecting and documenting these portions of the patient history, but this information is unobtainable despite attempted review and therefore cannot be documented at this time.   Ventilator Settings:     Vt (Set, L): 0.5 L  Resp Rate (Set): 10  Pressure Support (cm H2O): 12 cm H20  FiO2 (%): 30 %  PEEP/CPAP (cm H2O): 5 cm H20  Minute Ventilation (L/min) (Obs): 11.1 L/min  Resp Rate (Observed) Vent: 20  I:E Ratio (Set): 1:2.80  I:E Ratio (Obs): 1:2.6  PIP Observed (cm H2O): 19 cm H2O     Physical Exam:  Temp:  [98 °F (36.7 °C)-98.5 °F (36.9 °C)] 98.5 °F (36.9 °C)  Heart Rate:  [] 118  Resp:  [14-29] 19  BP: ()/(30-87) 98/53  FiO2 (%):  [30 %] 30 %    Intake/Output Summary (Last 24 hours) at 12/2/2018 1010  Last data filed at 12/2/2018 0500  Gross per 24 hour   Intake 4087 ml   Output 390 ml   Net 3697 ml     SpO2 Percentage    12/02/18 0755 12/02/18 0800 12/02/18 0940   SpO2: 100%  Comment: post tx 100% 100%      Physical Exam   Constitutional: She appears well-nourished. She appears  unresponsive.    Non-toxic appearance. She has a sickly appearance. No distress. She is intubated.   HENT: ET tube in place.  Head: Normocephalic.   Nose: Nose normal.   Eyes: EOM are normal. No scleral icterus.   Neck: Neck supple. No tracheal deviation present.   Cardiovascular: Exam reveals no friction rub.   Pulmonary/Chest: No accessory muscle usage. She is intubated. No respiratory distress. She has no wheezes. She has no rhonchi. She has rales. She exhibits no tenderness.   Abdominal: Soft. She exhibits no distension. There is no tenderness. There is no guarding.   Fresh ileostomy in rlq.   Musculoskeletal: She exhibits no edema or deformity.   Generalized reduced muscle bulk   Neurological: She appears lethargic.   Skin: Skin is cool.  She is not diaphoretic. No erythema.   Psychiatric: Unresponsive.        Results from last 7 days   Lab Units  12/02/18   4059   12/01/18   0528  11/30/18   0523   WBC 10*3/mm3  33.04*  37.63*  43.68*   HEMOGLOBIN g/dL  8.9*  10.5*  12.8   PLATELETS 10*3/mm3  346  509*  505*     Results from last 7 days   Lab Units  12/02/18   0304  12/01/18   1238  12/01/18   0528   SODIUM mmol/L  137  138  141   POTASSIUM mmol/L  3.8  4.4  4.4   BUN mg/dL  54*  47*  46*   CREATININE mg/dL  2.70*  2.50*  2.47*     Results from last 7 days   Lab Units  12/02/18   0240  12/01/18   1200  12/01/18   0010   PH, ARTERIAL pH units  7.353  7.375  7.168*   PCO2, ARTERIAL mm Hg  30.3*  23.4*  43.0   PO2 ART mm Hg  103.0  111.0*  199.0*   FIO2 %  30  30  50     Blood Culture   Date Value Ref Range Status   11/29/2018 No growth at 2 days  Preliminary   11/25/2018 No growth at 5 days  Final   11/25/2018 No growth at 5 days  Final     Urine Culture   Date Value Ref Range Status   11/25/2018 >100,000 CFU/mL Citrobacter freundii (A)  Final     Recent films:  Xr Chest 1 View    Result Date: 12/1/2018  1. Satisfactory position of the endotracheal and enteric tubes.   This report was finalized on 12/01/2018 08:02 by Dr. Neeraj Hernandez MD.    Xr Chest 1 View    Result Date: 11/30/2018  Impression:  No acute cardiopulmonary disease.  This report was finalized on 11/30/2018 12:20 by Dr. Nuria Cornell MD.    Xr Abdomen Kub    Result Date: 11/30/2018  1. Gaseous distention of the small bowel could be due to obstruction or ileus.   This report was finalized on 11/30/2018 12:27 by Dr. Neeraj Hernandez MD.    Films reviewed personally by me.  My interpretation: Pending for today    Pulmonary Assessment:  1. Acute respiratory failure, due to recent anesthesia  2. COPD, moderate  3. Status post right colectomy and creation of ileostomy  4. Recent hip fracture  5. UTI, being treated  6. Severe leukocytosis  7. Anemia  8. Hypotension  9. Advanced age    Recommend:   · Peep decreased to 5, otherwise continue current vent settings  · Follow-up chest x-ray this morning  · Daily chest x-ray  and ABG while on vent  · She is being followed by infectious disease and nephrology  · She is not ready for spontaneous breathing trials today  · The family was updated at bedside by Dr. Barnett    Electronically signed by CROW Yanez on 12/2/2018 at 10:10 AM    Physician substantive contribution:  Pertinent symptoms/interval history include: not awake, remains on vent  Respiratory exam shows pertinent findings of:diminished breath sounds, stable, on vent.  Plan includes: continue supportive care and minimize sedation; spontaneous breathing trials  once more awake as long as peep can be reduced successfully  I have seen and examined patient personally, performing a face-to-face diagnostic evaluation with plan of care reviewed and developed with APRN and nursing staff. I have addended and/or modified the above history of present illness, physical examination, and assessment and plan to reflect my findings and impressions. Essential elements of the care plan were discussed with APRN above.  Agree with findings and assessment/plan as documented above.    Electronically signed by Chan Barnett MD, on 12/2/2018, 6:37 PM

## 2018-12-02 NOTE — PROGRESS NOTES
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    Subjective     Subjective     Patient remains intubated her rate of breathing is reduced to 15-18, blood pressure is adequate she did have some hypotensive episodes, her O2 sat is adequate on 30% per PEEP is elevated to 7, urine output is marginal NG output is 300 and stool output is minimal.  She continues intubated with minimal reaction.  Objective      Objective     Vital Signs  Temp:  [98 °F (36.7 °C)-98.3 °F (36.8 °C)] 98 °F (36.7 °C)  Heart Rate:  [] 110  Resp:  [14-29] 19  BP: ()/(30-62) 97/47  FiO2 (%):  [30 %] 30 %    Intake & Output (last 3 days)       11/29 0701 - 11/30 0700 11/30 0701 - 12/01 0700 12/01 0701 - 12/02 0700    P.O.   240    I.V. (mL/kg)  500 (8.9) 2779 (48.7)    Other   20    IV Piggyback 100 1200 100    TPN   422    Total Intake(mL/kg) 100 (1.8) 1700 (30.2) 3561 (62.4)    Urine (mL/kg/hr) 200 (0.2) 170 (0.1) 30 (0)    Emesis/NG output   300    Stool   10    Total Output 200 170 340    Net -100 +1530 +3221                 Physical Exam:     General Appearance:    Alert, cooperative, in no acute distress   Lungs:     Clear to auscultation,respirations regular, even and                  unlabored chest x-rays clear, tubular in good position, no infiltrate noted.      Heart:    Regular rhythm and normal rate, normal S1 and S2, no            murmur, no gallop, no rub, no click   Chest Wall:    No abnormalities observed   Abdomen:     Abdomen is flat, no bowel sounds, dressing is dry, ostomy has more dusky appearance looked good postoperatively and yesterday slightly more dusky appearance.  Minimal output, NG tube is bilious.        White count is reduced from 43-33, electrolytes are slightly improved with a CO2 of 17 from 12, liver function slightly elevated albumin  depressed at 1.8.  Results Review:     I reviewed the patient's new clinical results.  I reviewed the patient's new imaging results and agree with the interpretation.    Results from last 7 days   Lab Units  12/02/18   0304  12/01/18   0528  11/30/18   0523   WBC 10*3/mm3  33.04*  37.63*  43.68*   HEMOGLOBIN g/dL  8.9*  10.5*  12.8   HEMATOCRIT %  26.8*  32.0*  38.8   PLATELETS 10*3/mm3  346  509*  505*        Results from last 7 days   Lab Units  12/02/18   0304  12/01/18   1238  12/01/18   0528   SODIUM mmol/L  137  138  141   POTASSIUM mmol/L  3.8  4.4  4.4   CHLORIDE mmol/L  107  109  110   CO2 mmol/L  17.0*  12.0*  15.0*   BUN mg/dL  54*  47*  46*   CREATININE mg/dL  2.70*  2.50*  2.47*   CALCIUM mg/dL  6.8*  7.3*  7.1*   BILIRUBIN mg/dL  1.0  0.8  0.7   ALK PHOS U/L  108  133*  167*   ALT (SGPT) U/L  44  36  36   AST (SGOT) U/L  119*  93*  63*   GLUCOSE mg/dL  185*  112*  65*       Assessment/Plan     Assessment/Plan       Acute UTI      Currently we will give albumin, also another 500 of lactated Ringer's, believe that the patient is hypovolemic with surgery 36 hours prior probably the reason the ostomy is slightly dusky and elevated creatinine.  We will give bolus today also albumin also given amp of bicarbonate.  Otherwise no changes continued intubation and on ventilator.      Varun Lynch MD  12/02/18  6:45 AM      Time: time spent with patient 15 minutes     EMR Dragon/Transcription disclaimer: Much of this encounter note is an electronic transcription/translation of spoken language to printed text. The electronic translation of spoken language may permit erroneous, or at times, nonsensical words or phrases to be inadvertently transcribed; although I have reviewed the note for such errors, some may still exist.

## 2018-12-02 NOTE — PLAN OF CARE
Problem: Patient Care Overview  Goal: Plan of Care Review  Outcome: Ongoing (interventions implemented as appropriate)   12/02/18 0405   Coping/Psychosocial   Plan of Care Reviewed With patient;family   Plan of Care Review   Progress no change   OTHER   Outcome Summary TPN with Lipids infusing. Continue Bicarb gtt and Merrem. Pt will follow with eyes. Will not squeeze hands or wiggle toes. PERRLA. NSR-ST. SBP 90's to 110's. Lungs are clear but diminished at bases. Afebrile. Pressure injury to left outer leg from Leg immobilizer. Pictures taken and documented. Mepilex applied. RLQ colostomy bag with 10 ml sanquaneous drainage. Stoma protrudes above skin and is a dusky, red color. Seems to be more red toward end of shift. Urine output 50 ml total. Mophine given for pain x 1. Gross straps applied and wound care per orders.      Goal: Individualization and Mutuality  Outcome: Ongoing (interventions implemented as appropriate)    Goal: Discharge Needs Assessment  Outcome: Ongoing (interventions implemented as appropriate)    Goal: Interprofessional Rounds/Family Conf  Outcome: Ongoing (interventions implemented as appropriate)      Problem: Fall Risk (Adult)  Goal: Identify Related Risk Factors and Signs and Symptoms  Outcome: Ongoing (interventions implemented as appropriate)    Goal: Absence of Fall  Outcome: Ongoing (interventions implemented as appropriate)      Problem: Skin Injury Risk (Adult)  Goal: Identify Related Risk Factors and Signs and Symptoms  Outcome: Ongoing (interventions implemented as appropriate)    Goal: Skin Health and Integrity  Outcome: Ongoing (interventions implemented as appropriate)      Problem: Urinary Tract Infection (Adult)  Goal: Signs and Symptoms of Listed Potential Problems Will be Absent, Minimized or Managed (Urinary Tract Infection)  Outcome: Ongoing (interventions implemented as appropriate)      Problem: Nutrition, Imbalanced: Inadequate Oral Intake (Adult)  Goal: Improved  Oral Intake  Outcome: Ongoing (interventions implemented as appropriate)    Goal: Prevent Further Weight Loss  Outcome: Ongoing (interventions implemented as appropriate)      Problem: Nutrition, Parenteral (Adult)  Goal: Signs and Symptoms of Listed Potential Problems Will be Absent, Minimized or Managed (Nutrition, Parenteral)  Outcome: Ongoing (interventions implemented as appropriate)

## 2018-12-02 NOTE — PROGRESS NOTES
Nephrology ([facility]) Consult Note      Patient:  Negin Martinez  YOB: 1930  Date of Service: 12/2/2018  MRN: [id number]   Acct: [id number]   Primary Care Physician: Praveen Tellez MD  Advance Directive:   Code Status and Medical Interventions:   Ordered at: 11/25/18 1757     Limited Support to NOT Include:    Antiarrhythmic Drugs    Cardioversion/Defibrillation    Intubation     Level Of Support Discussed With:    Patient    Health Care Surrogate    Next of Kin (If No Surrogate)     Code Status:    No CPR     Medical Interventions (Level of Support Prior to Arrest):    Limited     Admit Date: 11/25/2018       Hospital Day: 7  Referring Provider: Isrrael Zavala MD      Patient Seen, Chart, Consults, Notes, Labs, Radiology studies reviewed.        Subjective:  Negin Martinez is a 88 y.o. female  whom we were consulted for YEE. She was having diarrhea prior to admission. She was recently discharged from rehab where she was recovering from left femur fracture repair. She was also treated for UTI. She was progressively getting weaker with decreased appetite. Her abdomen is sore and she is now NPO. She had no diarrhea today. Her workup showed large volume intraperitoneal free fluid with bilateral small layering pleural effusions and extensive flank edema. Her creatinine has risen from 0.9 (11/28) to 1.47 then to 2.04 (11/30). Renal service was consulted to manage her YEE. Patient is having some drop in her urine putput. She was assessed by general surgery and  underwent on 11/30 exploratory laparotomy with surgery, had bowel resection and colostomy. Her amylase and lipase were also elevated. She is  in CCU, still intubated, vented. She was oliguric. She was getting fluids and TPN. Patient remained oligoanuric, acidotic. She was tachypneic and on no sedation. She was also having hypotension and  was getting boluses of IV fluids.  Family was present at bedside.                           Allergies:  Cephalexin    Home Meds:  Medications Prior to Admission   Medication Sig Dispense Refill Last Dose   • acetaminophen (TYLENOL) 325 MG tablet Take 650 mg by mouth Every 4 (Four) Hours As Needed for Mild Pain  or Fever.   Past Week at Unknown time   • albuterol (ACCUNEB) 1.25 MG/3ML nebulizer solution Take 1 ampule by nebulization 2 (Two) Times a Day.   8/31/2018 at 0900   • albuterol (ACCUNEB) 1.25 MG/3ML nebulizer solution Take 1 ampule by nebulization At Night As Needed for Wheezing.   Past Week at Unknown time   • albuterol (PROVENTIL HFA;VENTOLIN HFA) 108 (90 Base) MCG/ACT inhaler Inhale 2 puffs Every 4 (Four) Hours As Needed for Wheezing.      • aluminum-magnesium hydroxide-simethicone (MAALOX/MYLANTA) 200-200-20 MG/5ML suspension Take 30 mL by mouth Every 6 (Six) Hours As Needed for Indigestion or Heartburn.      • cetirizine (zyrTEC) 10 MG tablet Take 10 mg by mouth Daily As Needed for Allergies.   Past Week at Unknown time   • cycloSPORINE (RESTASIS) 0.05 % ophthalmic emulsion Administer 1 drop to both eyes Every 12 (Twelve) Hours.   8/31/2018 at 0900   • docusate sodium 100 MG capsule Take 100 mg by mouth 2 (Two) Times a Day. (Patient taking differently: Take 100 mg by mouth As Needed.)      • eszopiclone (LUNESTA) 3 MG tablet Take 3 mg by mouth Every Night. Take immediately before bedtime   8/30/2018 at 2100   • guaiFENesin (ROBITUSSIN) 100 MG/5ML syrup Take 200 mg by mouth Every 4 (Four) Hours As Needed for Cough.   Past Week at Unknown time   • magnesium hydroxide (MILK OF MAGNESIA) 2400 MG/10ML suspension suspension Take 10 mL by mouth Daily As Needed (constipation). 10 mL     • montelukast (SINGULAIR) 10 MG tablet Take 10 mg by mouth Daily.      • pantoprazole (PROTONIX) 40 MG EC tablet Take 40 mg by mouth Daily.   8/31/2018 at 0900   • polyethyl glycol-propyl glycol (SYSTANE) 0.4-0.3 % solution ophthalmic solution Administer 2 drops to both eyes Every 4 (Four) Hours As Needed  (dry eyes).   Past Week at Unknown time   • sucralfate (CARAFATE) 1 GM/10ML suspension Take 1 g by mouth 3 (Three) Times a Day With Meals.      • traMADol (ULTRAM) 50 MG tablet Take 1 tablet by mouth Every 6 (Six) Hours As Needed for Moderate Pain . 12 tablet 0    • umeclidinium-vilanterol (ANORO ELLIPTA) 62.5-25 MCG/INH aerosol powder  inhaler Inhale 1 puff Daily.   8/31/2018 at 0900   • rOPINIRole (REQUIP) 0.5 MG tablet Take 0.5 mg by mouth Every Night. Take 1 hour before bedtime.   8/30/2018 at 2100       Medicines:  Current Facility-Administered Medications   Medication Dose Route Frequency Provider Last Rate Last Dose   • acetaminophen (TYLENOL) tablet 650 mg  650 mg Oral Q4H PRN Isrrael Zavala MD   650 mg at 11/29/18 0655   • Adult Standard Central TPN   Intravenous Q24H (TPN) Varun Lynch MD 55 mL/hr at 12/01/18 1758      And   • fat emulsion (INTRALIPID,LIPOSYN) 20 % infusion 50 g  250 mL Intravenous Q24H (TPN) Varun Lynch MD 20.8 mL/hr at 12/01/18 2046 50 g at 12/01/18 2046   • albuterol (PROVENTIL) nebulizer solution 0.042% 1.25 mg/3mL  1 ampule Nebulization BID - RT Isrrael Zavala MD   1.25 mg at 12/02/18 0747   • albuterol (PROVENTIL) nebulizer solution 0.042% 1.25 mg/3mL  1 ampule Nebulization Nightly PRN Isrrael Zavala MD   1.25 mg at 11/25/18 1907   • albuterol (PROVENTIL) nebulizer solution 0.5% 2.5 mg/0.5mL  2.5 mg Nebulization Q6H PRN Varun Lynch MD       • aspirin EC tablet 81 mg  81 mg Oral Daily Isrrael Zavala MD   81 mg at 12/02/18 0948   • atorvastatin (LIPITOR) tablet 40 mg  40 mg Oral Nightly Isrrael Zavala MD   40 mg at 11/28/18 2250   • cycloSPORINE (RESTASIS) 0.05 % ophthalmic emulsion 1 drop  1 drop Both Eyes Q12H Isrrael Zavala MD   1 drop at 12/02/18 0948   • enoxaparin (LOVENOX) syringe 30 mg  30 mg Subcutaneous Q24H Calin Sam MD   30 mg at 12/02/18 1144   • eszopiclone (LUNESTA) tablet 3 mg  3 mg Oral  Nightly PRN Isrrael Zavala MD   3 mg at 11/28/18 2252   • famotidine (PEPCID) injection 20 mg  20 mg Intravenous Daily Chan Barnett MD   20 mg at 12/02/18 0948   • HYDROcodone-acetaminophen (NORCO) 7.5-325 MG per tablet 1 tablet  1 tablet Oral Q4H PRN Varun Lynch MD       • insulin lispro (humaLOG) injection 0-9 Units  0-9 Units Subcutaneous Q6H Varun Lynch MD   2 Units at 12/02/18 0043   • ipratropium-albuterol (DUO-NEB) nebulizer solution 3 mL  3 mL Nebulization Q4H PRN Calin Sam MD       • lactated ringers bolus 1,000 mL  1,000 mL Intravenous Once Marco Hughes .7 mL/hr at 12/02/18 1237 1,000 mL at 12/02/18 1237   • LORazepam (ATIVAN) tablet 1 mg  1 mg Oral Q6H PRN Varun Lynch MD       • meropenem (MERREM) 500mg/100 mL 0.9% NS IVPB (mbp)  500 mg Intravenous Q12H Brenda Finney MD   500 mg at 12/02/18 0043   • montelukast (SINGULAIR) tablet 10 mg  10 mg Oral Daily Isrrael Zavala MD   10 mg at 12/02/18 0948   • Morphine injection 4 mg  4 mg Intravenous Q1H PRN Varun Lynch MD   2 mg at 12/02/18 1205   • nystatin (MYCOSTATIN) 272000 UNIT/ML suspension 500,000 Units  5 mL Swish & Swallow 4x Daily Calin Sam MD   500,000 Units at 12/02/18 1144   • ondansetron (ZOFRAN) 8 mg in sodium chloride 0.9 % 50 mL IVPB  8 mg Intravenous Q6H PRN Varun Lynch MD       • ondansetron (ZOFRAN) injection 4 mg  4 mg Intravenous Q6H PRN Isrrael Zavala MD   4 mg at 11/28/18 2250   • ondansetron ODT (ZOFRAN-ODT) disintegrating tablet 8 mg  8 mg Oral Q6H PRN Varun Lynch MD       • Pharmacy to Dose meropenem (MERREM)   Does not apply Continuous PRN Brenda Finney MD       • rOPINIRole (REQUIP) tablet 0.5 mg  0.5 mg Oral Nightly Isrrael Zavala MD   0.5 mg at 11/29/18 2012   • saccharomyces boulardii (FLORASTOR) capsule 500 mg  500 mg Oral BID Isrrael Zavala MD   500 mg at 12/02/18 0948   •  simethicone (MYLICON) chewable tablet 80 mg  80 mg Oral 4x Daily PRN Varun Lynch MD       • sodium bicarbonate 8.4 % 150 mEq in dextrose (D5W) 5 % 1,000 mL infusion (greater than 75 mEq)  75 mL/hr Intravenous Continuous Marco Hughes MD 75 mL/hr at 12/02/18 0721 75 mL/hr at 12/02/18 0721   • sodium chloride 0.9 % flush 10 mL  10 mL Intravenous PRN Mauro Magallanes Jr., MD       • sodium chloride 0.9 % flush 3 mL  3 mL Intravenous Q12H Isrrael Zavala MD   3 mL at 12/02/18 0037   • sodium chloride 0.9 % flush 3-10 mL  3-10 mL Intravenous PRN Isrrael Zavala MD       • sodium hypochlorite (DAKIN'S) 0.125 % topical solution 0.125% (quarter strength)   Topical Q12H Varun Lynch MD       • sucralfate (CARAFATE) 1 GM/10ML suspension 1 g  1 g Oral Q6H Varun Lynch MD   1 g at 12/02/18 1144   • tamsulosin (FLOMAX) 24 hr capsule 0.4 mg  0.4 mg Oral Daily Calin Sam MD   0.4 mg at 12/01/18 0923       Past Medical History:  Past Medical History:   Diagnosis Date   • COPD (chronic obstructive pulmonary disease) (CMS/[facility])    • GERD (gastroesophageal reflux disease)    • Hyperlipidemia    • Hypertension    • Restless leg    • Stroke (CMS/HCC)     residual left weakness        Past Surgical History:  Past Surgical History:   Procedure Laterality Date   • APPENDECTOMY     • CHOLECYSTECTOMY     • PARTIAL HIP ARTHROPLASTY Left    • REPLACEMENT TOTAL KNEE Left    • TONSILLECTOMY         Family History  History reviewed. No pertinent family history.    Social History  Social History     Socioeconomic History   • Marital status:      Spouse name: Not on file   • Number of children: Not on file   • Years of education: Not on file   • Highest education level: Not on file   Social Needs   • Financial resource strain: Not on file   • Food insecurity - worry: Not on file   • Food insecurity - inability: Not on file   • Transportation needs - medical: Not on file   •  "Transportation needs - non-medical: Not on file   Occupational History   • Not on file   Tobacco Use   • Smoking status: Former Smoker   • Smokeless tobacco: Never Used   • Tobacco comment: quit in 1990   Substance and Sexual Activity   • Alcohol use: No   • Drug use: No   • Sexual activity: Not on file   Other Topics Concern   • Not on file   Social History Narrative   • Not on file         Review of Systems:  unable to obtain.    Objective:  /67   Pulse (!) 122   Temp 98.5 °F (36.9 °C) (Axillary)   Resp 24   Ht 154.9 cm (61\")   Wt 57.1 kg (125 lb 14.4 oz)   SpO2 100%   BMI 23.79 kg/m²     Intake/Output Summary (Last 24 hours) at 12/2/2018 1312  Last data filed at 12/2/2018 1000  Gross per 24 hour   Intake 4487 ml   Output 390 ml   Net 4097 ml     General: intubated, vented  Chest: Bilateral air entry with decreased breath sounds at the bases  CVS:+/- regular rate and rhythm  Abdominal: colostomy +, soft,no guarding  Extremities: no cyanosis or edema  Skin: warm and dry, plantar discoloration over feet  Neuro: UTO        Labs:  Lab Results (last 72 hours)     Procedure Component Value Units Date/Time    Blood Culture With ERWIN - Blood, Arm, Right [[id number]] Collected:  11/29/18 1612    Specimen:  Blood from Arm, Right Updated:  11/30/18 1631     Blood Culture No growth at 24 hours    Peripheral Blood Smear [[id number]] Collected:  11/30/18 1332    Specimen:  Blood Updated:  11/30/18 1555     Performed by: Erica Cagle M.D.     Pathologist Interpretation --     Complete blood count and peripheral smear, review:  Absolute neutrophilia  Thrombocytosis    An absolute neutrophilia this degree and morphology is most often seen with infection or inflammation.  There is no significant left shift.  Thrombocytosis in this case is most likely an acute phase reaction secondary to the inflammation.  It is noted the patient has a urinary tract infection on this admission.    Lactic Acid, Plasma [[id number]]  " (Normal) Collected:  11/30/18 1332    Specimen:  Blood Updated:  11/30/18 1355     Lactate 2.0 mmol/L     Blood Culture - Blood, Wrist, Left [[id number]] Collected:  11/25/18 1205    Specimen:  Blood from Wrist, Left Updated:  11/30/18 1245     Blood Culture No growth at 5 days    Blood Culture - Blood, Arm, Right [[id number]] Collected:  11/25/18 1205    Specimen:  Blood from Arm, Right Updated:  11/30/18 1245     Blood Culture No growth at 5 days    CBC & Differential [[id number]] Collected:  11/30/18 0523    Specimen:  Blood Updated:  11/30/18 0626    Narrative:       The following orders were created for panel order CBC & Differential.  Procedure                               Abnormality         Status                     ---------                               -----------         ------                     Manual Differential[[id number]]          Abnormal            Final result               CBC Auto Differential[[id number]]        Abnormal            Final result                 Please view results for these tests on the individual orders.    CBC Auto Differential [[id number]]  (Abnormal) Collected:  11/30/18 0523    Specimen:  Blood Updated:  11/30/18 0626     WBC 43.68 10*3/mm3      RBC 4.45 10*6/mm3      Hemoglobin 12.8 g/dL      Hematocrit 38.8 %      MCV 87.2 fL      MCH 28.8 pg      MCHC 33.0 g/dL      RDW 15.4 %      RDW-SD 49.7 fl      MPV 11.1 fL      Platelets 505 10*3/mm3     Manual Differential [[id number]]  (Abnormal) Collected:  11/30/18 0523    Specimen:  Blood Updated:  11/30/18 0626     Neutrophil % 97.0 %      Lymphocyte % 0.0 %      Monocyte % 2.0 %      Bands %  1.0 %      Neutrophils Absolute 42.80 10*3/mm3      Lymphocytes Absolute 0.00 10*3/mm3      Monocytes Absolute 0.87 10*3/mm3      Anisocytosis Slight/1+     Crenated RBC's Large/3+     Poikilocytes Large/3+     WBC Morphology Normal     Platelet Estimate Increased    Comprehensive Metabolic Panel [[id number]]  (Abnormal) Collected:   11/30/18 0523    Specimen:  Blood Updated:  11/30/18 0617     Glucose 65 mg/dL      BUN 38 mg/dL      Creatinine 2.04 mg/dL      Sodium 139 mmol/L      Potassium 4.4 mmol/L      Chloride 110 mmol/L      CO2 16.0 mmol/L      Calcium 8.1 mg/dL      Total Protein 4.9 g/dL      Albumin 2.10 g/dL      ALT (SGPT) 23 U/L      AST (SGOT) 41 U/L      Alkaline Phosphatase 257 U/L      Total Bilirubin 0.6 mg/dL      eGFR Non African Amer 23 mL/min/1.73      Globulin 2.8 gm/dL      A/G Ratio 0.8 g/dL      BUN/Creatinine Ratio 18.6     Anion Gap 13.0 mmol/L     Narrative:       The MDRD GFR formula is only valid for adults with stable renal function between ages 18 and 70.    Lactic Acid, Plasma [[id number]]  (Normal) Collected:  11/29/18 2304    Specimen:  Blood Updated:  11/29/18 2323     Lactate 1.3 mmol/L     CBC & Differential [[id number]] Collected:  11/29/18 0426    Specimen:  Blood Updated:  11/29/18 0600    Narrative:       The following orders were created for panel order CBC & Differential.  Procedure                               Abnormality         Status                     ---------                               -----------         ------                     Manual Differential[[id number]]          Abnormal            Final result               CBC Auto Differential[[id number]]        Abnormal            Final result                 Please view results for these tests on the individual orders.    CBC Auto Differential [[id number]]  (Abnormal) Collected:  11/29/18 0426    Specimen:  Blood Updated:  11/29/18 0600     WBC 36.97 10*3/mm3      RBC 4.77 10*6/mm3      Hemoglobin 13.4 g/dL      Hematocrit 42.1 %      MCV 88.3 fL      MCH 28.1 pg      MCHC 31.8 g/dL      RDW 15.3 %      RDW-SD 49.5 fl      MPV 11.3 fL      Platelets 472 10*3/mm3     Manual Differential [[id number]]  (Abnormal) Collected:  11/29/18 0426    Specimen:  Blood Updated:  11/29/18 0600     Neutrophil % 89.1 %      Lymphocyte % 0.0 %      Monocyte % 2.2  %      Bands %  6.5 %      Atypical Lymphocyte % 2.2 %      Neutrophils Absolute 35.36 10*3/mm3      Lymphocytes Absolute 0.00 10*3/mm3      Monocytes Absolute 0.81 10*3/mm3      Crenated RBC's Mod/2+     Poikilocytes Mod/2+     WBC Morphology Normal     Platelet Estimate Increased    Basic Metabolic Panel [[id number]]  (Abnormal) Collected:  11/29/18 0426    Specimen:  Blood Updated:  11/29/18 0558     Glucose 84 mg/dL      BUN 29 mg/dL      Creatinine 1.47 mg/dL      Sodium 138 mmol/L      Potassium 4.5 mmol/L      Chloride 105 mmol/L      CO2 20.0 mmol/L      Calcium 8.5 mg/dL      eGFR Non African Amer 34 mL/min/1.73      BUN/Creatinine Ratio 19.7     Anion Gap 13.0 mmol/L     Narrative:       The MDRD GFR formula is only valid for adults with stable renal function between ages 18 and 70.    Basic Metabolic Panel [[id number]]  (Abnormal) Collected:  11/28/18 0705    Specimen:  Blood Updated:  11/28/18 0846     Glucose 100 mg/dL      BUN 21 mg/dL      Creatinine 0.90 mg/dL      Sodium 138 mmol/L      Potassium 4.4 mmol/L      Chloride 107 mmol/L      CO2 22.0 mmol/L      Calcium 8.5 mg/dL      eGFR Non African Amer 59 mL/min/1.73      BUN/Creatinine Ratio 23.3     Anion Gap 9.0 mmol/L     Narrative:       The MDRD GFR formula is only valid for adults with stable renal function between ages 18 and 70.    CBC & Differential [[id number]] Collected:  11/28/18 0705    Specimen:  Blood Updated:  11/28/18 0805    Narrative:       The following orders were created for panel order CBC & Differential.  Procedure                               Abnormality         Status                     ---------                               -----------         ------                     CBC Auto Differential[[id number]]        Abnormal            Final result                 Please view results for these tests on the individual orders.    CBC Auto Differential [[id number]]  (Abnormal) Collected:  11/28/18 0705    Specimen:  Blood Updated:   11/28/18 0805     WBC 25.57 10*3/mm3      RBC 4.47 10*6/mm3      Hemoglobin 12.8 g/dL      Hematocrit 39.0 %      MCV 87.2 fL      MCH 28.6 pg      MCHC 32.8 g/dL      RDW 15.0 %      RDW-SD 48.4 fl      MPV 11.4 fL      Platelets 460 10*3/mm3      Neutrophil % 84.3 %      Lymphocyte % 4.4 %      Monocyte % 9.0 %      Eosinophil % 0.9 %      Basophil % 0.4 %      Immature Grans % 1.0 %      Neutrophils, Absolute 21.57 10*3/mm3      Lymphocytes, Absolute 1.12 10*3/mm3      Monocytes, Absolute 2.29 10*3/mm3      Eosinophils, Absolute 0.23 10*3/mm3      Basophils, Absolute 0.10 10*3/mm3      Immature Grans, Absolute 0.26 10*3/mm3      nRBC 0.0 /100 WBC           Radiology:   Imaging Results (last 72 hours)     Procedure Component Value Units Date/Time    XR Abdomen KUB [[id number]] Collected:  11/30/18 1226     Updated:  11/30/18 1230    Narrative:       XR ABDOMEN KUB- 11/30/2018 11:32 AM CST     HISTORY: Abnormal CAT scan. Gaseous distention of the bowel. Ascites.       COMPARISON: CT 11/29/2018     FINDINGS:  Gaseous distention of the small bowel loops is again noted. A Damian  catheter is present. Atherosclerotic calcifications are seen.     Evaluation for free intraperitoneal air is limited on a supine  radiograph, but there are no definite findings of free intraperitoneal  air.      The osseous structures demonstrate no acute abnormality.        Impression:       1. Gaseous distention of the small bowel could be due to obstruction or  ileus.         This report was finalized on 11/30/2018 12:27 by Dr. Neeraj Hernandez MD.    XR Chest 1 View [[id number]] Collected:  11/30/18 1219     Updated:  11/30/18 1223    Narrative:       Exam:   XR CHEST 1 VW-       Date:  11/30/2018      History:  Female, age  88 years;fluid?; N39.0-Urinary tract infection,  site not specified; R41.82-Altered mental status, unspecified;  A04.72-Enterocolitis due to Clostridium difficile, not specified as  recurrent; R13.12-Dysphagia,  oropharyngeal phase; R53.1-Weakness;  Z74.09-Other reduced mobility; Z74.09-Other reduced mobility     COMPARISON:  None.     Findings :     The heart and mediastinum are normal in size. Lungs are without focal  infiltrate, mass or effusions. Right paratracheal calcified granuloma  redemonstrated.  The bones show no acute pathology.         Impression:       Impression:     No acute cardiopulmonary disease.     This report was finalized on 11/30/2018 12:20 by Dr. Nuria Cornell MD.    CT Abdomen Pelvis Without Contrast [[id number]] Collected:  11/29/18 1414     Updated:  11/29/18 1437    Narrative:       CT ABDOMEN AND PELVIS without contrast 11/29/2018 12:28 PM CST     HISTORY: Abdominal pain     COMPARISON: CT scan dated 9/5/2018      DLP: 282 mGy cm     TECHNIQUE: Noncontrast enhanced images of the abdomen and pelvis  obtained without oral contrast.      FINDINGS:   Bilateral small layering pleural effusions. No lung consolidation. There  is a small pericardial effusion.      LIVER: Grossly normal.      BILIARY SYSTEM: Color appears surgically absent. Biliary tree is  nondilated.      PANCREAS: 2.1 cm oval low-attenuation cystic lesion at the pancreatic  head neck junction is less well seen on this exam is compared with the  prior.      SPLEEN: Normal in size. There is a slightly heterogeneous appearance of  the no discrete lesion is seen.      KIDNEYS AND ADRENALS: Adrenal glands are unremarkable. No hydronephrosis  identified. The ureters are decompressed and normal in appearance.     RETROPERITONEUM: No mass, lymphadenopathy or hemorrhage.      GI TRACT: Fluid identified in a nondistended distal esophagus. The  stomach is nondistended. The small bowel is near completely  decompressed. Moderate stool identified in the ascending colon.  Air-fluid levels are identified in the ascending and proximal transverse  colon with the colon measuring up to 3.7 cm. The descending and sigmoid  colon are completely  decompressed.     OTHER: Large volume intraperitoneal free fluid throughout the abdomen.  No gross intraperitoneal free air. No organized extraluminal collection  identified. Osseous structures and soft tissues demonstrate no worrisome  lesions. Lower thoracic and upper lumbar compression deformities are  chronic. Prominent bilateral flank edema.     PELVIS: No mass lesion, fluid collection or significant lymphadenopathy  is seen in the pelvis. The Damian catheter is in place. The Damian  catheter loops within the urinary bladder. The bladder is moderately  distended.       Impression:       1. Large volume intraperitoneal free fluid with bilateral small layering  pleural effusions and extensive flank edema.  2. Again seen is a rounded low-attenuation lesion at the pancreatic head  neck junction measuring up to 2.1 cm. This appears to be arising within  the pancreatic parenchyma. Pancreatic neoplasm is certainly a  consideration.  3. The bowel is nondistended. Difficult to evaluate for bowel wall  thickening without IV contrast. There does not appear to be evidence for  viscous perforation or peritoneal abscess.  4. Damian catheter is looped within the urinary bladder.        This report was finalized on 11/29/2018 14:34 by Dr Peterson Morris, .          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 Hold dialysis. Give calcium supplements. Saline boluses as needed for low BP (family is not wanting to start pressors). Prognosis is guarded. Family is also wanting to focus on comfort measures and morphine will be given at lower dose ordered so the dose would  not be held for low BP. Family was expecting other members to arrive and they are considering withdrawing care.    Critical care spent 35 minutes.     Marco Hughes MD  12/2/2018  1:12 PM

## 2018-12-02 NOTE — PROGRESS NOTES
[facility] Medicine Services  INPATIENT PROGRESS NOTE    Patient Name: Negin Martinez  Date of Admission: 11/25/2018  Today's Date: 12/02/18  Length of Stay: 7  Primary Care Physician: Praveen Tellez MD    Subjective   Chief Complaint: f/u   HPI   BP low - requiring bolus of fluid.  Family no desire for pressor support and anticipating withdrawal of care while waiting for family members      Review of Systems   Not obtainable.      Objective    Temp:  [98 °F (36.7 °C)-98.5 °F (36.9 °C)] (P) 98.4 °F (36.9 °C)  Heart Rate:  [] 120  Resp:  [14-26] 22  BP: ()/(30-87) 91/50  FiO2 (%):  [30 %] 30 %  Physical Exam  Hemodynamically stable. Intubated on mech vent  She is laying flat in bed.  No gross distress, flat affect  No distress  Constitutional: drowsy, lethargic   edentulous  HENT:   Head: Normocephalic and atraumatic.   Nose: Nose normal.   Eyes: Conjunctivae and EOM are normal.   Neck: Normal range of motion. Neck supple.   Cardiovascular: Normal rate, regular rhythm and normal heart sounds.   Pulmonary/Chest: intubated on mech vent, cta;  Abdominal: Soft. Hypoactive .  + ostomy, + packing on surgical wound, no gross discharge.  Musculoskeletal:  Huge immobilizer on her left LE  Neurological: eyes jose shut close; drowsy/lethargic  Skin: Skin is warm and dry.   Psychiatric: She has a  flat  affect.   Warm dry skin    family at bedsisde  Purplish discoloration of her sole and cold distal extremities        Results Review:  I have reviewed the labs, radiology results, and diagnostic studies.    Laboratory Data:   Results from last 7 days   Lab Units  12/02/18   0304  12/01/18   0528  11/30/18   0523   WBC 10*3/mm3  33.04*  37.63*  43.68*   HEMOGLOBIN g/dL  8.9*  10.5*  12.8   HEMATOCRIT %  26.8*  32.0*  38.8   PLATELETS 10*3/mm3  346  509*  505*        Results from last 7 days   Lab Units  12/02/18   0304  12/01/18   1238  12/01/18   0528   SODIUM mmol/L  137  138  141    POTASSIUM mmol/L  3.8  4.4  4.4   CHLORIDE mmol/L  107  109  110   CO2 mmol/L  17.0*  12.0*  15.0*   BUN mg/dL  54*  47*  46*   CREATININE mg/dL  2.70*  2.50*  2.47*   CALCIUM mg/dL  6.8*  7.3*  7.1*   BILIRUBIN mg/dL  1.0  0.8  0.7   ALK PHOS U/L  108  133*  167*   ALT (SGPT) U/L  44  36  36   AST (SGOT) U/L  119*  93*  63*   GLUCOSE mg/dL  185*  112*  65*       Culture Data:   Blood Culture   Date Value Ref Range Status   11/29/2018 No growth at 2 days  Preliminary     Wound Culture   Date Value Ref Range Status   11/30/2018 No growth at 2 days  Preliminary       Radiology Data:   Imaging Results (last 24 hours)     Procedure Component Value Units Date/Time    XR Chest 1 View [[id number]] Collected:  12/02/18 1139     Updated:  12/02/18 1143    Narrative:       XR CHEST 1 VW- 12/2/2018 11:15 AM CST     HISTORY: on vent; N39.0-Urinary tract infection, site not specified;  R41.82-Altered mental status, unspecified; A04.72-Enterocolitis due to  Clostridium difficile, not specified as recurrent; R13.12-Dysphagia,  oropharyngeal phase; R53.1-Weakness; Z74.09-Other reduced mobility;  Z74.09-Other reduced mobility; K56.609-Unspecified intestinal  obstruction, unspecified as to partial versus complete obstruction     COMPARISON: 12/1/2018      FINDINGS:   A PICC line extends into the proximal SVC. The endotracheal and enteric  tubes are stable. No new opacities or pneumothoraces are visualized in  the chest. The cardiomediastinal silhouette and pulmonary vascularity  are unchanged.       No acute osseous or soft tissue abnormality is noted.        Impression:       1. No significant interval change since previous exam.        This report was finalized on 12/02/2018 11:39 by Dr. Neeraj Hernandez MD.          I have reviewed the patient's current medications.     Assessment/Plan     Active Hospital Problems    Diagnosis   • Acute UTI         · s/p resection on Nov 30 and ostomy for worsening abdominal pain in patient with c  dif and UTI - ischemic bowel    · Post op respiratory failure  · Hypotension   · leukocytosis  slowly improving    · YEE  Worsening -  per Renal service  · Metabolic acidosis  2nd to above  · Edema felt sec to third space loss  · Sepsis from c diff and  urinary tract infection (citrobacter) -   · C. Difficile associated diarrhea - changed to IV metronidazole due to NPO status  · History of left  hip fracture in September 2018 with immobilizer  · COPD no exacerbation   · Gastroesophageal reflux disease  · Hyperlipidemia  · Hypodensity on recent CT (pancreas)  would plan to repeat in an outpatient setting  · Hyponatremia  · Urinary retention - this time required fc;        Cont supportive management   Family awaiting family to come in and anticipate withdrawal of care  Cont current management.  Pain med  Discussed with family and nurse Clara.                Calin Sam MD   12/02/18   2:47 PM

## 2018-12-02 NOTE — PROGRESS NOTES
"Infectious Diseases Progress Note    Patient:  Negin Martinez  YOB: 1930  MRN: [id number]   Admit date: 11/25/2018   Admitting Physician: Calin Sam*  Primary Care Physician: Praveen Tellez MD    Chief Complaint/Interval History: She is without fever.  She remains on the vent.  Family at bedside.  They seem to understand her very tenuous status.  She does not have much urine output.  Creatinine may be leveling off slightly.    Intake/Output Summary (Last 24 hours) at 12/2/2018 0837  Last data filed at 12/2/2018 0500  Gross per 24 hour   Intake 4087 ml   Output 390 ml   Net 3697 ml     Allergies:   Allergies   Allergen Reactions   • Cephalexin Rash     Current Scheduled Medications:     albuterol 1 ampule Nebulization BID - RT   aspirin 81 mg Oral Daily   atorvastatin 40 mg Oral Nightly   cycloSPORINE 1 drop Both Eyes Q12H   enoxaparin 30 mg Subcutaneous Q24H   famotidine 20 mg Intravenous Daily   fat emulsion 250 mL Intravenous Q24H (TPN)   insulin lispro 0-9 Units Subcutaneous Q6H   lactated ringers 500 mL Intravenous Once   meropenem 500 mg Intravenous Q12H   montelukast 10 mg Oral Daily   nystatin 5 mL Swish & Swallow 4x Daily   rOPINIRole 0.5 mg Oral Nightly   saccharomyces boulardii 500 mg Oral BID   sodium chloride 3 mL Intravenous Q12H   sodium hypochlorite  Topical Q12H   sucralfate 1 g Oral Q6H   tamsulosin 0.4 mg Oral Daily     Current PRN Medications:  •  acetaminophen  •  albuterol  •  albuterol  •  eszopiclone  •  HYDROcodone-acetaminophen  •  ipratropium-albuterol  •  LORazepam  •  Morphine  •  ondansetron  •  ondansetron  •  ondansetron ODT  •  Pharmacy to Dose meropenem (MERREM)  •  simethicone  •  [COMPLETED] Insert peripheral IV **AND** sodium chloride  •  sodium chloride    Review of Systems unobtainable from patient    Vital Signs:  BP 98/53   Pulse 118   Temp 98.5 °F (36.9 °C) (Axillary)   Resp 19   Ht 154.9 cm (61\")   Wt 57.1 kg (125 lb 14.4 oz)   SpO2 100%   " BMI 23.79 kg/m²     Physical Exam  Vital signs reviewed.  Feet slightly cool and mottled.  Abdomen ostomy slightly dusky in comparison to yesterday but appears viable.  Lungs with out significant crackles or wheezing  Heart mildly tachycardic  Skin without rash    Line/IV site: No erythema, warmth, induration, or tenderness.    Lab Results:  CBC: Results from last 7 days   Lab Units  12/02/18   0304  12/01/18   0528  11/30/18   0523  11/29/18   0426  11/28/18   0705  11/27/18   0633  11/26/18   0433   WBC 10*3/mm3  33.04*  37.63*  43.68*  36.97*  25.57*  18.92*  15.32*   HEMOGLOBIN g/dL  8.9*  10.5*  12.8  13.4  12.8  12.6  12.1   HEMATOCRIT %  26.8*  32.0*  38.8  42.1  39.0  39.5  37.1   PLATELETS 10*3/mm3  346  509*  505*  472*  460*  380  326     BMP:  Results from last 7 days   Lab Units  12/02/18   0304  12/01/18   1238  12/01/18   0528  12/01/18   0010  11/30/18   0523  11/29/18   0426  11/28/18   0705  11/27/18   0654   11/25/18   1209   SODIUM mmol/L  137  138  141   --   139  138  138  138   < >  132*   SODIUM, ARTERIAL mmol/L   --    --    --   139   --    --    --    --    --    --    POTASSIUM mmol/L  3.8  4.4  4.4   --   4.4  4.5  4.4  4.3   < >  4.1   CHLORIDE mmol/L  107  109  110   --   110  105  107  106   < >  96*   CO2 mmol/L  17.0*  12.0*  15.0*   --   16.0*  20.0*  22.0*  24.0   < >  28.0   BUN mg/dL  54*  47*  46*   --   38*  29*  21  21   < >  38*   CREATININE mg/dL  2.70*  2.50*  2.47*   --   2.04*  1.47*  0.90  0.85   < >  1.01   GLUCOSE mg/dL  185*  112*  65*   --   65*  84  100  83   < >  108*   CALCIUM mg/dL  6.8*  7.3*  7.1*   --   8.1*  8.5  8.5  8.4   < >  9.0   ALT (SGPT) U/L  44  36  36   --   23   --    --    --    --   34    < > = values in this interval not displayed.     Culture Results:   Blood Culture   Date Value Ref Range Status   11/29/2018 No growth at 2 days  Preliminary   11/25/2018 No growth at 5 days  Final   11/25/2018 No growth at 5 days  Final     Urine Culture    Date Value Ref Range Status   11/25/2018 >100,000 CFU/mL Citrobacter freundii (A)  Final     Wound Culture   Date Value Ref Range Status   11/30/2018 No growth at 24 hours  Preliminary     Radiology: None  Additional Studies Reviewed: None    Impression:   1.  Postop day #2 following right hemicolectomy with end ileostomy  2.  Leukocytosis slight improvement  3.  C. difficile positive  4.  Acute renal injury  5.  Positive urine culture for Citrobacter  6.  Advanced age    Recommendations:   Feel current antibacterial treatment with Merrem and current dose appropriate  Do not feel there are additional measures that can be done from a supportive standpoint at present.  She is getting ventilatory support, volume, and antibiotic treatment.  Continue current treatment    Brice Blake MD

## 2018-12-03 NOTE — THERAPY DISCHARGE NOTE
Acute Care - Occupational Therapy Discharge Summary  [facility]     Patient Name: Negin Martinez  : 1/10/1930  MRN: [id number]    Today's Date: 12/3/2018  Onset of Illness/Injury or Date of Surgery: 18    Date of Referral to OT: 18  Referring Physician: Dr. Zavala      Admit Date: 2018        OT Recommendation and Plan    Visit Dx:    ICD-10-CM ICD-9-CM   1. Acute UTI N39.0 599.0   2. Altered mental status, unspecified altered mental status type R41.82 780.97   3. C. difficile diarrhea A04.72 008.45   4. Oropharyngeal dysphagia R13.12 787.22   5. Decreased strength, endurance, and mobility R53.1 780.79    Z74.09 780.99   6. Impaired mobility and ADLs Z74.09 799.89   7. Bowel obstruction (CMS/[facility]) K56.609 560.9               OT Rehab Goals     Row Name 18 0700             Transfer Goal 1 (OT)    Activity/Assistive Device (Transfer Goal 1, OT)  bed-to-chair/chair-to-bed;toilet;shower chair;walk-in shower;commode, bedside without drop arms  -TS      [place] Level/Cues Needed (Transfer Goal 1, OT)  contact guard assist  -TS      Time Frame (Transfer Goal 1, OT)  long term goal (LTG);by discharge  -TS      Progress/Outcome (Transfer Goal 1, OT)  goal not met  -TS         Dressing Goal 1 (OT)    Activity/Assistive Device (Dressing Goal 1, OT)  lower body dressing  -TS      [place]/Cues Needed (Dressing Goal 1, OT)  minimum assist (75% or more patient effort)  -TS      Time Frame (Dressing Goal 1, OT)  long term goal (LTG);by discharge  -TS      Progress/Outcome (Dressing Goal 1, OT)  goal not met  -TS         Toileting Goal 1 (OT)    Activity/Device (Toileting Goal 1, OT)  toileting skills, all;commode;commode, bedside without drop arms  -TS      [place] Level/Cues Needed (Toileting Goal 1, OT)  minimum assist (75% or more patient effort)  -TS      Time Frame (Toileting Goal 1, OT)  long term goal (LTG);by discharge  -TS      Progress/Outcome (Toileting Goal 1, OT)  goal not met  -TS         User Key  (r) = Recorded By, (t) = Taken By, (c) = Cosigned By    Initials Name Provider Type Discipline    TS Dayana Wasserman COTA/L Occupational Therapy Assistant OT              Therapy Suggested Charges     Code   Minutes Charges    None                 OT Discharge Summary  Reason for Discharge: Patient   Outcomes Achieved: Refer to plan of care for updates on goals achieved  Discharge Destination: other (comment)(pt  )      MALIK So  12/3/2018

## 2018-12-03 NOTE — PLAN OF CARE
Problem: Patient Care Overview  Goal: Plan of Care Review   18 0254   OTHER   Outcome Summary Pt  18 0054

## 2018-12-03 NOTE — PROGRESS NOTES
Continued Stay Note  [facility]     Patient Name: Negin Martinez  MRN: [id number]  Today's Date: 12/3/2018    Admit Date: 2018    Discharge Plan     Row Name 18 1126       Plan    Final Discharge Disposition Code  20 -     Final Note  patient         Discharge Codes    No documentation.             ANAHI Nunez

## 2018-12-03 NOTE — PLAN OF CARE
Problem: Patient Care Overview  Goal: Plan of Care Review  Outcome: Outcome(s) achieved Date Met: 12/03/18    Goal: Discharge Needs Assessment  Outcome: Unable to achieve outcome(s) by discharge Date Met: 12/03/18      Problem: Fall Risk (Adult)  Goal: Identify Related Risk Factors and Signs and Symptoms  Outcome: Unable to achieve outcome(s) by discharge Date Met: 12/03/18    Goal: Absence of Fall  Outcome: Unable to achieve outcome(s) by discharge Date Met: 12/03/18      Problem: Skin Injury Risk (Adult)  Goal: Identify Related Risk Factors and Signs and Symptoms  Outcome: Unable to achieve outcome(s) by discharge Date Met: 12/03/18    Goal: Skin Health and Integrity  Outcome: Unable to achieve outcome(s) by discharge Date Met: 12/03/18

## 2018-12-03 NOTE — THERAPY DISCHARGE NOTE
Acute Care - Physical Therapy Discharge Summary  [facility]       Patient Name: Negin Martinez  : 1/10/1930  MRN: [id number]    Today's Date: 12/3/2018  Onset of Illness/Injury or Date of Surgery: 18    Date of Referral to PT: 18  Referring Physician: Dr. Zavala      Admit Date: 2018      PT Recommendation and Plan    Visit Dx:    ICD-10-CM ICD-9-CM   1. Acute UTI N39.0 599.0   2. Altered mental status, unspecified altered mental status type R41.82 780.97   3. C. difficile diarrhea A04.72 008.45   4. Oropharyngeal dysphagia R13.12 787.22   5. Decreased strength, endurance, and mobility R53.1 780.79    Z74.09 780.99   6. Impaired mobility and ADLs Z74.09 799.89   7. Bowel obstruction (CMS/HCC) K56.609 560.9             Therapy Suggested Charges     Code   Minutes Charges    56034 (CPT®) Hc Pt Neuromusc Re Education Ea 15 Min      95446 (CPT®) Hc Pt Ther Proc Ea 15 Min      25813 (CPT®) Hc Gait Training Ea 15 Min 17 1    93334 (CPT®) Hc Pt Therapeutic Act Ea 15 Min      26308 (CPT®) Hc Pt Manual Therapy Ea 15 Min      84648 (CPT®) Hc Pt Iontophoresis Ea 15 Min      16541 (CPT®) Hc Pt Elec Stim Ea-Per 15 Min      38856 (CPT®) Hc Pt Ultrasound Ea 15 Min      58000 (CPT®) Hc Pt Self Care/Mgmt/Train Ea 15 Min      84209 (CPT®) Hc Pt Prosthetic (S) Train Initial Encounter, Each 15 Min      61292 (CPT®) Hc Pt Orthotic(S)/Prosthetic(S) Encounter, Each 15 Min      18536 (CPT®) Hc Orthotic(S) Mgmt/Train Initial Encounter, Each 15min      Total  17 1          PT Rehab Goals     Row Name 18 1631             Bed Mobility Goal 1 (PT)    Activity/Assistive Device (Bed Mobility Goal 1, PT)  rolling to left;rolling to right;scooting;sit to supine;supine to sit  -NW      [place] Level/Cues Needed (Bed Mobility Goal 1, PT)  supervision required  -NW      Time Frame (Bed Mobility Goal 1, PT)  by discharge  -NW      Progress/Outcomes (Bed Mobility Goal 1, PT)  goal not met  -NW         Transfer Goal 1 (PT)     Activity/Assistive Device (Transfer Goal 1, PT)  sit-to-stand/stand-to-sit;lateral transfer stand pivot transfer  -NW      [place] Level/Cues Needed (Transfer Goal 1, PT)  minimum assist (75% or more patient effort)  -NW      Time Frame (Transfer Goal 1, PT)  by discharge  -NW      Progress/Outcome (Transfer Goal 1, PT)  goal not met  -NW        User Key  (r) = Recorded By, (t) = Taken By, (c) = Cosigned By    Initials Name Provider Type Discipline    NW Kayli Alfaro PTA Physical Therapy Assistant PT              PT Discharge Summary  Anticipated Discharge Disposition (PT): other (see comments)()  Reason for Discharge: Patient   Outcomes Achieved: Other()  Discharge Destination: (S) other (comment)()      Kayli Alfaro PTA   12/3/2018

## 2018-12-04 LAB — BACTERIA SPEC AEROBE CULT: NORMAL

## 2018-12-05 LAB
BACTERIA SPEC AEROBE CULT: NORMAL
BACTERIA SPEC ANAEROBE CULT: NORMAL
CYTO UR: NORMAL
GRAM STN SPEC: NORMAL
GRAM STN SPEC: NORMAL
LAB AP CASE REPORT: NORMAL
PATH REPORT.FINAL DX SPEC: NORMAL
PATH REPORT.GROSS SPEC: NORMAL

## 2018-12-06 NOTE — THERAPY DISCHARGE NOTE
Acute Care - Speech Language Pathology Discharge Summary  [facility]       Patient Name: Negin Martinez  : 1/10/1930  MRN: [id number]    Today's Date: 2018  Onset of Illness/Injury or Date of Surgery: 18       Referring Physician: Dr. Zavala        Admit Date: 2018      SLP Recommendation and Plan    Visit Dx:    ICD-10-CM ICD-9-CM   1. Acute UTI N39.0 599.0   2. Altered mental status, unspecified altered mental status type R41.82 780.97   3. C. difficile diarrhea A04.72 008.45   4. Oropharyngeal dysphagia R13.12 787.22   5. Decreased strength, endurance, and mobility R53.1 780.79    Z74.09 780.99   6. Impaired mobility and ADLs Z74.09 799.89   7. Bowel obstruction (CMS/[facility]) K56.609 560.9               SLP GOALS     Row Name 18 1000             Oral Nutrition/Hydration Goal 1 (SLP)    Oral Nutrition/Hydration Goal 1, SLP  Pt will tolerate LRD without overt s/s of aspiration.  -MB      Time Frame (Oral Nutrition/Hydration Goal 1, SLP)  by discharge  -MB      Barriers (Oral Nutrition/Hydration Goal 1, SLP)  Hearing impairment, generalized weakness   -MB      Progress/Outcomes (Oral Nutrition/Hydration Goal 1, SLP)  goal not met  -MB         Labial Strengthening Goal 1 (SLP)    Activity (Labial Strengthening Goal 1, SLP)  increase labial tone  -MB      Increase Labial Tone  labial resistance exercises  -MB      [place]/Accuracy (Labial Strengthening Goal 1, SLP)  with minimal cues (75-90% accuracy)  -MB      Time Frame (Labial Strengthening Goal 1, SLP)  by discharge  -MB      Barriers (Labial Strengthening Goal 1, SLP)  Hearing impairment, fatigue  -MB      Progress/Outcomes (Labial Strengthening Goal 1, SLP)  goal not met  -MB        User Key  (r) = Recorded By, (t) = Taken By, (c) = Cosigned By    Initials Name Provider Type    Clayton Medeiros, CCC-SLP Speech and Language Pathologist                  SLP Discharge Summary  Anticipated Dischage Disposition: skilled nursing  facility  Reason for Discharge: other (see comments)(Pt )  Progress Toward Achieving Short/long Term Goals: other (see comments)(Pt )  Discharge Destination: other (see comments)(Pt )      Clayton Matthews CCC-SLP  2018

## 2018-12-16 NOTE — DISCHARGE SUMMARY
She is an 88-year-old woman who presented with generalized weakness.  She had hip fracture sometime in September of this year and had surgery.  She was discharged to skilled nursing facility.  She had prior history of Escherichia coli urinary tract infection as well during that admission treated with Invanz.    top of generalized weakness, she has been having episodes of diarrhea and increase urinary frequency without dysuria, fever or chills.     Her GI PCR panel detected Clostridium difficile toxin.  Urine culture is positive for large amount of leukocytes esterase and nitrite.  White count is noted at 18,000 dominant subjective neutrophils at 82%.  Head CT scan showed no evidence of acute intracranial findings.  She has moderate to severe chronic microvascular changes.  Chest x-ray showed emphysema with no focal consolidation     Past medical history includes chronic obstructive pulmonary disease, gastroesophageal reflux disease, hyperlipidemia, hypertension, restless leg syndrome, history of stroke with reported residual left weakness.    Her situation evolved and required  exploratory laparotomy and right colectomy on Nov. 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  She presented with weakness and initially treated for C. Diff colitis and cystitis.  Her clinnical condition became more complicated by renal failure, hypotension, respiratory failure requiring mechanical ventilation.  Family decided to pursue no further aggressive treatment. She  passed on  On Dec 3 at 0051H.     · s/p resection on Nov 30 and ostomy for worsening abdominal pain in patient with c dif and UTI - ischemic bowel    · Post op respiratory failure  · Hypotension   · leukocytosis  sec to above    · YEE  worsening    · Metabolic acidosis  2nd to above  · Edema felt sec to third  space loss  · Sepsis from c diff and  urinary tract infection (citrobacter) -   · C. Difficile associated diarrhea -   · History of left  hip fracture in September 2018   · COPD no exacerbation   · Gastroesophageal reflux disease  · Hyperlipidemia  · Hypodensity on recent CT (pancreas)    · Hyponatremia  · Urinary retention

## (undated) DEVICE — SYS SKIN CLS DERMABOND PRINEO W/22CM MESH TP

## (undated) DEVICE — INTENDED FOR TISSUE SEPARATION, AND OTHER PROCEDURES THAT REQUIRE A SHARP SURGICAL BLADE TO PUNCTURE OR CUT.: Brand: BARD-PARKER ® STAINLESS STEEL BLADES

## (undated) DEVICE — GLV SURG TRIUMPH MICRO PF LTX 7.5 STRL

## (undated) DEVICE — BIT DRL QC DIA 4.3X180MM

## (undated) DEVICE — DEV REDUC PULL DRL GUIDE PERC 4.3MM DISP

## (undated) DEVICE — PK TURNOVER RM ADV

## (undated) DEVICE — SUT PROLN 1 CT1 30IN 8425H

## (undated) DEVICE — COTTON UNDERCAST PADDING,REGULAR FINISH: Brand: WEBRIL

## (undated) DEVICE — IMMOB KN 3PNL DLX CANVS 19IN BLU

## (undated) DEVICE — SUT SILK 2/0 SH 75CM 30IN BLK C016D

## (undated) DEVICE — GOWN,PREVENTION PLUS,XLONG/XLARGE,STRL: Brand: MEDLINE

## (undated) DEVICE — 3M™ STERI-DRAPE™ U-DRAPE 1015: Brand: STERI-DRAPE™

## (undated) DEVICE — SUT SILK 2/0 SUTUPAK TIES 24IN SA75H

## (undated) DEVICE — SUT VIC 3/0 RB1 27IN UD VCP215H

## (undated) DEVICE — BIOPATCH™ ANTIMICROBIAL DRESSING WITH CHLORHEXIDINE GLUCONATE IS A HYDROPHILLIC POLYURETHANE ABSORPTIVE FOAM WITH CHLORHEXIDINE GLUCONATE (CHG) WHICH INHIBITS BACTERIAL GROWTH UNDER THE DRESSING. THE DRESSING IS INTENDED TO BE USED TO ABSORB EXUDATE, COVER A WOUND CAUSED BY VASCULAR AND NONVASCULAR PERCUTANEOUS MEDICAL DEVICES DURING SURGERY, AS WELL AS REDUCE LOCAL INFECTION AND COLONIZATION OF MICROORGANISMS.: Brand: BIOPATCH

## (undated) DEVICE — SPNG GZ WOVN 4X4IN 12PLY 10/BX STRL

## (undated) DEVICE — CLTH CLENS READYCLEANSE PERI CARE PK/5

## (undated) DEVICE — TRY PREP SCRB VAG PVP

## (undated) DEVICE — 4-PORT MANIFOLD: Brand: NEPTUNE 2

## (undated) DEVICE — DRAPE,U/ SHT,SPLIT,PLAS,STERIL: Brand: MEDLINE

## (undated) DEVICE — TOTAL TRAY, 16FR 10ML SIL FOLEY, URN: Brand: MEDLINE

## (undated) DEVICE — ELECTRD BLD EXT EDGE 1P COAT 6.5IN STRL

## (undated) DEVICE — GLV SURG TRIUMPH MICRO PF LTX 8 STRL

## (undated) DEVICE — SUT VIC 4/0 PC3 18IN UD VCP845G

## (undated) DEVICE — BARRIER, ABSORBABLE, ADHESION: Brand: SEPRAFILM®

## (undated) DEVICE — SUT ETHLN 3/0 FS1 30IN 669H

## (undated) DEVICE — PAD GRND REM POLYHESIVE A/ DISP

## (undated) DEVICE — 3M™ STERI-STRIP™ REINFORCED ADHESIVE SKIN CLOSURES, R1546, 1/4 IN X 4 IN (6 MM X 100 MM), 10 STRIPS/ENVELOPE: Brand: 3M™ STERI-STRIP™

## (undated) DEVICE — OPTIFOAM GENTLE SA, POSTOP, 4X12: Brand: MEDLINE

## (undated) DEVICE — GLV SURG TRIUMPH GREEN W/ALOE PF LTX 8.5 STRL

## (undated) DEVICE — BLANKT BLANKETROL A/

## (undated) DEVICE — CVR BRD ARM 13X30

## (undated) DEVICE — 3M™ IOBAN™ 2 ANTIMICROBIAL INCISE DRAPE 6651EZ: Brand: IOBAN™ 2

## (undated) DEVICE — ELECTRD BLD EDGE/INSUL1P 2.4X5.1MM STRL

## (undated) DEVICE — TOWEL,OR,DSP,ST,BLUE,DLX,10/PK,8PK/CS: Brand: MEDLINE

## (undated) DEVICE — SHORT LENS-STERILE

## (undated) DEVICE — SUT SILK 3/0 SUTUPAK TIES 24IN SA74H

## (undated) DEVICE — SHEET, T, LAPAROTOMY, STERILE: Brand: MEDLINE

## (undated) DEVICE — CVR UNIV C/ARM

## (undated) DEVICE — GW THRD DRL/TP 2.5X200MM

## (undated) DEVICE — LARGE, DISPOSABLE ALEXIS O C-SECTION PROTECTOR - RETRACTOR: Brand: ALEXIS ® O C-SECTION PROTECTOR - RETRACTOR

## (undated) DEVICE — PK HIP TOTL 30

## (undated) DEVICE — MAJOR DOUBLE BASIN W/GOWNS II: Brand: MEDLINE INDUSTRIES, INC.

## (undated) DEVICE — PROXIMATE RH ROTATING HEAD SKIN STAPLERS (35 WIDE) CONTAINS 35 STAINLESS STEEL STAPLES: Brand: PROXIMATE

## (undated) DEVICE — ADHS LIQ MASTISOL 2/3ML

## (undated) DEVICE — SUT SILK 3/0 SH CR8 30IN C017D

## (undated) DEVICE — PAD MAJOR: Brand: MEDLINE INDUSTRIES, INC.

## (undated) DEVICE — DRP C/ARMOR